# Patient Record
Sex: FEMALE | Race: WHITE | NOT HISPANIC OR LATINO
[De-identification: names, ages, dates, MRNs, and addresses within clinical notes are randomized per-mention and may not be internally consistent; named-entity substitution may affect disease eponyms.]

---

## 2018-12-14 ENCOUNTER — MED ADMIN CHARGE (OUTPATIENT)
Age: 33
End: 2018-12-14

## 2018-12-14 ENCOUNTER — APPOINTMENT (OUTPATIENT)
Dept: FAMILY MEDICINE | Facility: CLINIC | Age: 33
End: 2018-12-14
Payer: MEDICAID

## 2018-12-14 VITALS
WEIGHT: 103 LBS | DIASTOLIC BLOOD PRESSURE: 78 MMHG | OXYGEN SATURATION: 99 % | BODY MASS INDEX: 20.22 KG/M2 | HEART RATE: 73 BPM | TEMPERATURE: 97.6 F | SYSTOLIC BLOOD PRESSURE: 113 MMHG | HEIGHT: 60 IN

## 2018-12-14 DIAGNOSIS — Z83.49 FAMILY HISTORY OF OTHER ENDOCRINE, NUTRITIONAL AND METABOLIC DISEASES: ICD-10-CM

## 2018-12-14 DIAGNOSIS — Z80.42 FAMILY HISTORY OF MALIGNANT NEOPLASM OF PROSTATE: ICD-10-CM

## 2018-12-14 DIAGNOSIS — Z76.89 PERSONS ENCOUNTERING HEALTH SERVICES IN OTHER SPECIFIED CIRCUMSTANCES: ICD-10-CM

## 2018-12-14 DIAGNOSIS — Z83.3 FAMILY HISTORY OF DIABETES MELLITUS: ICD-10-CM

## 2018-12-14 DIAGNOSIS — N63.20 UNSPECIFIED LUMP IN THE LEFT BREAST, UNSPECIFIED QUADRANT: ICD-10-CM

## 2018-12-14 LAB
BASOPHILS # BLD AUTO: 0.01 K/UL
BASOPHILS NFR BLD AUTO: 0.2 %
EOSINOPHIL # BLD AUTO: 0 K/UL
EOSINOPHIL NFR BLD AUTO: 0 %
HCT VFR BLD CALC: 42.5 %
HGB BLD-MCNC: 14 G/DL
IMM GRANULOCYTES NFR BLD AUTO: 0.2 %
LYMPHOCYTES # BLD AUTO: 1.19 K/UL
LYMPHOCYTES NFR BLD AUTO: 22.3 %
MAN DIFF?: NORMAL
MCHC RBC-ENTMCNC: 31.4 PG
MCHC RBC-ENTMCNC: 32.9 GM/DL
MCV RBC AUTO: 95.3 FL
MONOCYTES # BLD AUTO: 0.47 K/UL
MONOCYTES NFR BLD AUTO: 8.8 %
NEUTROPHILS # BLD AUTO: 3.66 K/UL
NEUTROPHILS NFR BLD AUTO: 68.5 %
PLATELET # BLD AUTO: 217 K/UL
RBC # BLD: 4.46 M/UL
RBC # FLD: 13.2 %
WBC # FLD AUTO: 5.34 K/UL

## 2018-12-14 PROCEDURE — G0009: CPT

## 2018-12-14 PROCEDURE — 99385 PREV VISIT NEW AGE 18-39: CPT | Mod: 25

## 2018-12-14 PROCEDURE — 36415 COLL VENOUS BLD VENIPUNCTURE: CPT

## 2018-12-14 PROCEDURE — 90670 PCV13 VACCINE IM: CPT

## 2018-12-14 RX ORDER — FOLIC ACID 1 MG/1
1 TABLET ORAL DAILY
Qty: 30 | Refills: 3 | Status: ACTIVE | COMMUNITY
Start: 2018-12-14

## 2018-12-14 RX ORDER — CARBAMAZEPINE 200 MG/1
200 TABLET, EXTENDED RELEASE ORAL
Refills: 0 | Status: ACTIVE | COMMUNITY
Start: 2018-12-14

## 2018-12-14 RX ORDER — ACETAZOLAMIDE 250 MG/1
250 TABLET ORAL TWICE DAILY
Refills: 0 | Status: ACTIVE | COMMUNITY
Start: 2018-12-14

## 2018-12-14 NOTE — ASSESSMENT
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

## 2018-12-14 NOTE — PHYSICAL EXAM

## 2018-12-14 NOTE — HISTORY OF PRESENT ILLNESS
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 Hx Shunts with several revisions (last 2008)\par Seen in the past seen by GI.\par 2008: Horse back riding accident, and since then is unable to walk independently with severe weight loss.\par \par Pt attends day care program at [facility] in [facility].\par \par According to mother, pt is a" happy child"

## 2018-12-14 NOTE — HEALTH RISK ASSESSMENT
[Good] : ~his/her~  mood as  good [Patient not ambulatory (Wheelchair)] : Patient is not ambulatory (Wheelchair) [0] : 2) Feeling down, depressed, or hopeless: Not at all (0) [HIV test declined] : HIV test declined [Hepatitis C test declined] : Hepatitis C test declined [None] : None [With Family] : lives with family [On disability] : on disability [Single] : single [Feels Safe at Home] : Feels safe at home [Full assistance needed] : managing finances [Smoke Detector] : smoke detector [Seat Belt] :  uses seat belt [Discussed at today's visit] : Advance Directives Discussed at today's visit [Designated Healthcare Proxy] : Designated healthcare proxy [Name: ___] : Health Care Proxy's Name: [unfilled]  [Relationship: ___] : Relationship: [unfilled] [Aggressive treatment] : aggressive treatment [] : No [Change in mental status noted] : No change in mental status noted [Language] : denies difficulty with language [Handling Complex Tasks] : denies difficulty handling complex tasks [Sexually Active] : not sexually active [Reports changes in hearing] : Reports no changes in hearing [Reports changes in vision] : Reports no changes in vision [Reports changes in dental health] : Reports no changes in dental health [de-identified] : Dr Bhagat in [place] [de-identified] : [facility]

## 2018-12-21 LAB
25(OH)D3 SERPL-MCNC: 51.3 NG/ML
ALBUMIN SERPL ELPH-MCNC: 4.6 G/DL
ALP BLD-CCNC: 65 U/L
ALT SERPL-CCNC: 19 U/L
ANION GAP SERPL CALC-SCNC: 10 MMOL/L
AST SERPL-CCNC: 23 U/L
BILIRUB SERPL-MCNC: 0.2 MG/DL
BUN SERPL-MCNC: 11 MG/DL
CALCIUM SERPL-MCNC: 9 MG/DL
CHLORIDE SERPL-SCNC: 108 MMOL/L
CHOLEST SERPL-MCNC: 161 MG/DL
CHOLEST/HDLC SERPL: 2.1 RATIO
CO2 SERPL-SCNC: 23 MMOL/L
CREAT SERPL-MCNC: 0.55 MG/DL
FOLATE SERPL-MCNC: >20 NG/ML
GLUCOSE SERPL-MCNC: 83 MG/DL
HCV AB SER QL: NONREACTIVE
HCV S/CO RATIO: 0.09 S/CO
HDLC SERPL-MCNC: 76 MG/DL
HIV1+2 AB SPEC QL IA.RAPID: NONREACTIVE
LDLC SERPL CALC-MCNC: 67 MG/DL
POTASSIUM SERPL-SCNC: 4.8 MMOL/L
PROT SERPL-MCNC: 7.1 G/DL
SODIUM SERPL-SCNC: 141 MMOL/L
TRIGL SERPL-MCNC: 90 MG/DL
TSH SERPL-ACNC: 1.71 UIU/ML
VIT B12 SERPL-MCNC: >2000 PG/ML

## 2019-02-22 ENCOUNTER — APPOINTMENT (OUTPATIENT)
Dept: FAMILY MEDICINE | Facility: CLINIC | Age: 34
End: 2019-02-22
Payer: MEDICAID

## 2019-02-22 VITALS
SYSTOLIC BLOOD PRESSURE: 107 MMHG | TEMPERATURE: 99 F | HEART RATE: 76 BPM | HEIGHT: 60 IN | WEIGHT: 103 LBS | OXYGEN SATURATION: 98 % | BODY MASS INDEX: 20.22 KG/M2 | DIASTOLIC BLOOD PRESSURE: 72 MMHG

## 2019-02-22 PROCEDURE — 99213 OFFICE O/P EST LOW 20 MIN: CPT

## 2019-02-22 NOTE — PHYSICAL EXAM
[No Acute Distress] : no acute distress [Well Nourished] : well nourished [No Respiratory Distress] : no respiratory distress  [Clear to Auscultation] : lungs were clear to auscultation bilaterally [No Accessory Muscle Use] : no accessory muscle use [Normal Rate] : normal rate  [Regular Rhythm] : with a regular rhythm [Normal S1, S2] : normal S1 and S2 [No Murmur] : no murmur heard [de-identified] : n [de-identified] : wheeled in on a wheelchair.

## 2019-02-22 NOTE — HISTORY OF PRESENT ILLNESS
[Parent] : parent [FreeTextEntry8] : Pt presents to have paperwork filled out for home care services. Pt is accompanied by her mother, no acute complains.

## 2019-05-13 ENCOUNTER — RX CHANGE (OUTPATIENT)
Age: 34
End: 2019-05-13

## 2019-07-12 ENCOUNTER — APPOINTMENT (OUTPATIENT)
Dept: FAMILY MEDICINE | Facility: CLINIC | Age: 34
End: 2019-07-12

## 2019-09-06 ENCOUNTER — APPOINTMENT (OUTPATIENT)
Dept: FAMILY MEDICINE | Facility: CLINIC | Age: 34
End: 2019-09-06
Payer: MEDICAID

## 2019-09-06 ENCOUNTER — APPOINTMENT (OUTPATIENT)
Dept: FAMILY MEDICINE | Facility: CLINIC | Age: 34
End: 2019-09-06

## 2019-09-06 VITALS — BODY MASS INDEX: 19.53 KG/M2 | WEIGHT: 100 LBS

## 2019-09-06 VITALS
HEART RATE: 76 BPM | DIASTOLIC BLOOD PRESSURE: 67 MMHG | SYSTOLIC BLOOD PRESSURE: 99 MMHG | TEMPERATURE: 97.5 F | HEIGHT: 60 IN

## 2019-09-06 DIAGNOSIS — H61.23 IMPACTED CERUMEN, BILATERAL: ICD-10-CM

## 2019-09-06 PROCEDURE — 99395 PREV VISIT EST AGE 18-39: CPT | Mod: 25

## 2019-09-06 PROCEDURE — 36415 COLL VENOUS BLD VENIPUNCTURE: CPT

## 2019-09-06 NOTE — ASSESSMENT
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

## 2019-09-06 NOTE — HISTORY OF PRESENT ILLNESS
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 Hx Shunts with several revisions (last 2008)\par Seen in the past  by GI.\par 2008: Horse back riding accident, and since then is unable to walk independently with severe weight loss.\par \par Pt attends day care program at [facility] in [facility].\par \par According to mother, pt is a" happy child" \par

## 2019-09-06 NOTE — PHYSICAL EXAM
[No Acute Distress] : no acute distress [Well Developed] : well developed [Well Nourished] : well nourished [Well-Appearing] : well-appearing [Normal Sclera/Conjunctiva] : normal sclera/conjunctiva [EOMI] : extraocular movements intact [PERRL] : pupils equal round and reactive to light [No JVD] : no jugular venous distention [Normal Oropharynx] : the oropharynx was normal [Normal Outer Ear/Nose] : the outer ears and nose were normal in appearance [Thyroid Normal, No Nodules] : the thyroid was normal and there were no nodules present [Supple] : supple [No Lymphadenopathy] : no lymphadenopathy [No Respiratory Distress] : no respiratory distress  [No Accessory Muscle Use] : no accessory muscle use [Clear to Auscultation] : lungs were clear to auscultation bilaterally [Normal Rate] : normal rate  [Regular Rhythm] : with a regular rhythm [Normal S1, S2] : normal S1 and S2 [No Murmur] : no murmur heard [No Carotid Bruits] : no carotid bruits [No Abdominal Bruit] : a ~M bruit was not heard ~T in the abdomen [No Varicosities] : no varicosities [No Edema] : there was no peripheral edema [Pedal Pulses Present] : the pedal pulses are present [No Palpable Aorta] : no palpable aorta [No Extremity Clubbing/Cyanosis] : no extremity clubbing/cyanosis [Non-distended] : non-distended [Soft] : abdomen soft [Non Tender] : non-tender [No HSM] : no HSM [No Masses] : no abdominal mass palpated [Normal Bowel Sounds] : normal bowel sounds [Normal Posterior Cervical Nodes] : no posterior cervical lymphadenopathy [No CVA Tenderness] : no CVA  tenderness [Normal Anterior Cervical Nodes] : no anterior cervical lymphadenopathy [No Spinal Tenderness] : no spinal tenderness [No Joint Swelling] : no joint swelling [No Rash] : no rash [Grossly Normal Strength/Tone] : grossly normal strength/tone [Normal Affect] : the affect was normal [Normal Insight/Judgement] : insight and judgment were intact [de-identified] : wheelchair bound [de-identified] : B/L impacted wax [de-identified] : non verbal. neurologic deficits. Non ambulatory

## 2019-09-06 NOTE — COUNSELING
[Use recommended devices] : Use recommended devices [Behavioral health counseling provided] : Behavioral health counseling provided [Needs reinforcement, provided] : Patient needs reinforcement on understanding of lifestyle changes and steps needed to achieve self management goal; reinforcement was provided [Limited decision making ability] : Limited decision making ability

## 2019-09-06 NOTE — HEALTH RISK ASSESSMENT
[Good] : ~his/her~  mood as  good [No] : In the past 12 months have you used drugs other than those required for medical reasons? No [0] : 2) Feeling down, depressed, or hopeless: Not at all (0) [HIV test declined] : HIV test declined [Language] : difficulty with language [Hepatitis C test declined] : Hepatitis C test declined [Behavior] : difficulty with behavior [Learning/Retaining New Information] : difficulty learning/retaining new information [Reasoning] : difficulty with reasoning [Handling Complex Tasks] : difficulty handling complex tasks [Spatial Ability and Orientation] : difficulty with spatial ability and orientation [Single] : single [With Family] : lives with family [Behavioral] : behavioral [Full assistance needed] : managing medications [Smoke Detector] : smoke detector [Seat Belt] :  uses seat belt [Designated Healthcare Proxy] : Designated healthcare proxy [With Patient/Caregiver] : With Patient/Caregiver [Name: ___] : Health Care Proxy's Name: [unfilled]  [Relationship: ___] : Relationship: [unfilled] [Patient not ambulatory] : Patient is not ambulatory [de-identified] : no [] : No [de-identified] : no [Change in mental status noted] : No change in mental status noted [Sexually Active] : not sexually active [Reports changes in hearing] : Reports no changes in hearing [Reports changes in dental health] : Reports no changes in dental health [Reports changes in vision] : Reports no changes in vision [MammogramComments] : Annual Breast USG due to long hx Left breast lump (2010 aprox) [PapSmearComments] : N/A [ColonoscopyComments] : N/A [BoneDensityComments] : N/A [de-identified] : Attends Day care at [facility] in [facility] [AdvancecareDate] : 09/06/19

## 2019-09-09 LAB
25(OH)D3 SERPL-MCNC: 54.4 NG/ML
ALBUMIN SERPL ELPH-MCNC: 4.8 G/DL
ALP BLD-CCNC: 52 U/L
ALT SERPL-CCNC: 15 U/L
ANION GAP SERPL CALC-SCNC: 13 MMOL/L
AST SERPL-CCNC: 12 U/L
BASOPHILS # BLD AUTO: 0.02 K/UL
BASOPHILS NFR BLD AUTO: 0.5 %
BILIRUB SERPL-MCNC: 0.2 MG/DL
BUN SERPL-MCNC: 18 MG/DL
CALCIUM SERPL-MCNC: 9.2 MG/DL
CHLORIDE SERPL-SCNC: 112 MMOL/L
CHOLEST SERPL-MCNC: 170 MG/DL
CHOLEST/HDLC SERPL: 1.9 RATIO
CO2 SERPL-SCNC: 20 MMOL/L
CREAT SERPL-MCNC: 0.63 MG/DL
EOSINOPHIL # BLD AUTO: 0 K/UL
EOSINOPHIL NFR BLD AUTO: 0 %
FERRITIN SERPL-MCNC: 47 NG/ML
FOLATE SERPL-MCNC: >20 NG/ML
GLUCOSE SERPL-MCNC: 88 MG/DL
HCT VFR BLD CALC: 42.2 %
HDLC SERPL-MCNC: 89 MG/DL
HGB BLD-MCNC: 13.7 G/DL
IMM GRANULOCYTES NFR BLD AUTO: 0.3 %
LDLC SERPL CALC-MCNC: 69 MG/DL
LYMPHOCYTES # BLD AUTO: 1.19 K/UL
LYMPHOCYTES NFR BLD AUTO: 30.8 %
MAN DIFF?: NORMAL
MCHC RBC-ENTMCNC: 31.5 PG
MCHC RBC-ENTMCNC: 32.5 GM/DL
MCV RBC AUTO: 97 FL
MONOCYTES # BLD AUTO: 0.36 K/UL
MONOCYTES NFR BLD AUTO: 9.3 %
NEUTROPHILS # BLD AUTO: 2.28 K/UL
NEUTROPHILS NFR BLD AUTO: 59.1 %
PLATELET # BLD AUTO: 170 K/UL
POTASSIUM SERPL-SCNC: 4.9 MMOL/L
PROT SERPL-MCNC: 6.9 G/DL
RBC # BLD: 4.35 M/UL
RBC # FLD: 12.5 %
SODIUM SERPL-SCNC: 145 MMOL/L
TRIGL SERPL-MCNC: 61 MG/DL
TSH SERPL-ACNC: 1.07 UIU/ML
VIT B12 SERPL-MCNC: 906 PG/ML
WBC # FLD AUTO: 3.86 K/UL

## 2019-11-21 ENCOUNTER — MEDICATION RENEWAL (OUTPATIENT)
Age: 34
End: 2019-11-21

## 2019-11-22 ENCOUNTER — MEDICATION RENEWAL (OUTPATIENT)
Age: 34
End: 2019-11-22

## 2019-12-03 ENCOUNTER — MEDICATION RENEWAL (OUTPATIENT)
Age: 34
End: 2019-12-03

## 2019-12-16 DIAGNOSIS — N39.42 INCONTINENCE W/OUT SENSORY AWARENESS: ICD-10-CM

## 2020-01-24 ENCOUNTER — APPOINTMENT (OUTPATIENT)
Dept: FAMILY MEDICINE | Facility: CLINIC | Age: 35
End: 2020-01-24
Payer: MEDICAID

## 2020-01-24 VITALS
OXYGEN SATURATION: 97 % | WEIGHT: 100 LBS | SYSTOLIC BLOOD PRESSURE: 113 MMHG | HEART RATE: 77 BPM | HEIGHT: 60 IN | BODY MASS INDEX: 19.63 KG/M2 | DIASTOLIC BLOOD PRESSURE: 73 MMHG

## 2020-01-24 PROCEDURE — 99213 OFFICE O/P EST LOW 20 MIN: CPT

## 2020-01-24 NOTE — HEALTH RISK ASSESSMENT
[No] : In the past 12 months have you used drugs other than those required for medical reasons? No [No falls in past year] : Patient reported no falls in the past year [(PHQ-2) Unable to screen] : PHQ-2: unable to screen [] : No [UnableToScreenReason] : Cerebral palsy

## 2020-01-24 NOTE — ASSESSMENT
[FreeTextEntry1] : Pt currently stable but still in need of Home Care services. Letter of necessity typed and given to patient's mother.\par No need for medication refills at this time\par Continue current regimen\par Follow up as scheduled.\par \par Discussed with sUpervising physician Dr Lynnette Sommer M.D.

## 2020-01-24 NOTE — HISTORY OF PRESENT ILLNESS
[FreeTextEntry1] : Letter for home care [de-identified] : Pt presents brought in by her mother, requesting a letter so that she can continue to receive home care. Pt's mother states that she had a " cold last week" with low grade fever which since has subsided. No other complains

## 2020-03-05 RX ORDER — EPINEPHRINE 0.3 MG/.3ML
0.3 INJECTION INTRAMUSCULAR
Qty: 1 | Refills: 0 | Status: ACTIVE | COMMUNITY
Start: 2019-05-13 | End: 1900-01-01

## 2020-06-15 DIAGNOSIS — Z93.1 GASTROSTOMY STATUS: ICD-10-CM

## 2020-10-27 ENCOUNTER — TRANSCRIPTION ENCOUNTER (OUTPATIENT)
Age: 35
End: 2020-10-27

## 2021-01-05 DIAGNOSIS — Z20.822 CONTACT WITH AND (SUSPECTED) EXPOSURE TO COVID-19: ICD-10-CM

## 2021-01-08 ENCOUNTER — NON-APPOINTMENT (OUTPATIENT)
Age: 36
End: 2021-01-08

## 2021-05-27 ENCOUNTER — APPOINTMENT (OUTPATIENT)
Dept: FAMILY MEDICINE | Facility: CLINIC | Age: 36
End: 2021-05-27
Payer: MEDICARE

## 2021-05-27 VITALS
WEIGHT: 110 LBS | HEIGHT: 60 IN | BODY MASS INDEX: 21.6 KG/M2 | OXYGEN SATURATION: 98 % | RESPIRATION RATE: 16 BRPM | DIASTOLIC BLOOD PRESSURE: 70 MMHG | SYSTOLIC BLOOD PRESSURE: 110 MMHG | TEMPERATURE: 98.2 F | HEART RATE: 83 BPM

## 2021-05-27 DIAGNOSIS — Z23 ENCOUNTER FOR IMMUNIZATION: ICD-10-CM

## 2021-05-27 PROCEDURE — 99213 OFFICE O/P EST LOW 20 MIN: CPT

## 2021-05-27 RX ORDER — DIAZEPAM 5 MG/100UL
5 SPRAY NASAL
Refills: 0 | Status: ACTIVE | COMMUNITY
Start: 2021-05-27

## 2021-05-27 RX ORDER — DIAZEPAM 10 MG/2ML
10 GEL RECTAL
Refills: 0 | Status: DISCONTINUED | COMMUNITY
Start: 2018-12-14 | End: 2021-05-27

## 2021-05-27 NOTE — PHYSICAL EXAM
[Well Nourished] : well nourished [Well Developed] : well developed [Well-Appearing] : well-appearing [Normal Sclera/Conjunctiva] : normal sclera/conjunctiva [PERRL] : pupils equal round and reactive to light [EOMI] : extraocular movements intact [Normal Outer Ear/Nose] : the outer ears and nose were normal in appearance [Normal Oropharynx] : the oropharynx was normal [No JVD] : no jugular venous distention [No Lymphadenopathy] : no lymphadenopathy [Supple] : supple [Thyroid Normal, No Nodules] : the thyroid was normal and there were no nodules present [No Respiratory Distress] : no respiratory distress  [No Accessory Muscle Use] : no accessory muscle use [Clear to Auscultation] : lungs were clear to auscultation bilaterally [Normal Rate] : normal rate  [Regular Rhythm] : with a regular rhythm [Normal S1, S2] : normal S1 and S2 [No Murmur] : no murmur heard [No Carotid Bruits] : no carotid bruits [No Abdominal Bruit] : a ~M bruit was not heard ~T in the abdomen [No Varicosities] : no varicosities [Pedal Pulses Present] : the pedal pulses are present [No Edema] : there was no peripheral edema [No Palpable Aorta] : no palpable aorta [No Extremity Clubbing/Cyanosis] : no extremity clubbing/cyanosis [Soft] : abdomen soft [Non Tender] : non-tender [Non-distended] : non-distended [No Masses] : no abdominal mass palpated [No HSM] : no HSM [Normal Bowel Sounds] : normal bowel sounds [Normal Posterior Cervical Nodes] : no posterior cervical lymphadenopathy [Normal Anterior Cervical Nodes] : no anterior cervical lymphadenopathy [No CVA Tenderness] : no CVA  tenderness [No Spinal Tenderness] : no spinal tenderness [No Joint Swelling] : no joint swelling [Grossly Normal Strength/Tone] : grossly normal strength/tone [No Rash] : no rash [Normal Affect] : the affect was normal [Normal Insight/Judgement] : insight and judgment were intact [de-identified] : wheelchair bound/ Non verbal [de-identified] : B/L impacted wax [de-identified] : non verbal. neurologic deficits. Non ambulatory

## 2021-05-27 NOTE — HISTORY OF PRESENT ILLNESS
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 Hx Shunts with several revisions (last 2008)\par Seen in the past by GI.\par 2008: Horse back riding accident, and since then is unable to walk independently with severe weight loss.\par Pt attends day care program at [facility] in [facility].\par According to mother, pt is a" happy child" \par  \par

## 2021-05-27 NOTE — ASSESSMENT
[FreeTextEntry1] : Pt currently stable but still in need of Home Care services. \par No need for medication refills at this time\par Continue current regimen\par Follow up q 6 months\par Will request blood test done at [facility] in 3/2021.\par Continue regular f/up with Neurology.,\par \par

## 2021-07-21 ENCOUNTER — OUTPATIENT (OUTPATIENT)
Dept: OUTPATIENT SERVICES | Facility: HOSPITAL | Age: 36
LOS: 1 days | End: 2021-07-21
Payer: MEDICARE

## 2021-07-21 VITALS
DIASTOLIC BLOOD PRESSURE: 86 MMHG | OXYGEN SATURATION: 99 % | RESPIRATION RATE: 20 BRPM | SYSTOLIC BLOOD PRESSURE: 120 MMHG | HEART RATE: 94 BPM | TEMPERATURE: 98 F | WEIGHT: 104.94 LBS | HEIGHT: 60 IN

## 2021-07-21 DIAGNOSIS — Z98.890 OTHER SPECIFIED POSTPROCEDURAL STATES: Chronic | ICD-10-CM

## 2021-07-21 DIAGNOSIS — Z98.2 PRESENCE OF CEREBROSPINAL FLUID DRAINAGE DEVICE: Chronic | ICD-10-CM

## 2021-07-21 DIAGNOSIS — K05.6 PERIODONTAL DISEASE, UNSPECIFIED: ICD-10-CM

## 2021-07-21 DIAGNOSIS — Z87.898 PERSONAL HISTORY OF OTHER SPECIFIED CONDITIONS: ICD-10-CM

## 2021-07-21 DIAGNOSIS — Z01.818 ENCOUNTER FOR OTHER PREPROCEDURAL EXAMINATION: ICD-10-CM

## 2021-07-21 DIAGNOSIS — K02.62 DENTAL CARIES ON SMOOTH SURFACE PENETRATING INTO DENTIN: ICD-10-CM

## 2021-07-21 LAB
ANION GAP SERPL CALC-SCNC: 14 MMOL/L — SIGNIFICANT CHANGE UP (ref 5–17)
BUN SERPL-MCNC: 14 MG/DL — SIGNIFICANT CHANGE UP (ref 7–23)
CALCIUM SERPL-MCNC: 9.3 MG/DL — SIGNIFICANT CHANGE UP (ref 8.4–10.5)
CHLORIDE SERPL-SCNC: 109 MMOL/L — HIGH (ref 96–108)
CO2 SERPL-SCNC: 17 MMOL/L — LOW (ref 22–31)
CREAT SERPL-MCNC: 0.52 MG/DL — SIGNIFICANT CHANGE UP (ref 0.5–1.3)
GLUCOSE SERPL-MCNC: 82 MG/DL — SIGNIFICANT CHANGE UP (ref 70–99)
HCT VFR BLD CALC: 42.8 % — SIGNIFICANT CHANGE UP (ref 34.5–45)
HGB BLD-MCNC: 14.1 G/DL — SIGNIFICANT CHANGE UP (ref 11.5–15.5)
MCHC RBC-ENTMCNC: 30.9 PG — SIGNIFICANT CHANGE UP (ref 27–34)
MCHC RBC-ENTMCNC: 32.9 GM/DL — SIGNIFICANT CHANGE UP (ref 32–36)
MCV RBC AUTO: 93.7 FL — SIGNIFICANT CHANGE UP (ref 80–100)
NRBC # BLD: 0 /100 WBCS — SIGNIFICANT CHANGE UP (ref 0–0)
PLATELET # BLD AUTO: 203 K/UL — SIGNIFICANT CHANGE UP (ref 150–400)
POTASSIUM SERPL-MCNC: 4.4 MMOL/L — SIGNIFICANT CHANGE UP (ref 3.5–5.3)
POTASSIUM SERPL-SCNC: 4.4 MMOL/L — SIGNIFICANT CHANGE UP (ref 3.5–5.3)
RBC # BLD: 4.57 M/UL — SIGNIFICANT CHANGE UP (ref 3.8–5.2)
RBC # FLD: 12.4 % — SIGNIFICANT CHANGE UP (ref 10.3–14.5)
SODIUM SERPL-SCNC: 140 MMOL/L — SIGNIFICANT CHANGE UP (ref 135–145)
WBC # BLD: 6.6 K/UL — SIGNIFICANT CHANGE UP (ref 3.8–10.5)
WBC # FLD AUTO: 6.6 K/UL — SIGNIFICANT CHANGE UP (ref 3.8–10.5)

## 2021-07-21 PROCEDURE — 85027 COMPLETE CBC AUTOMATED: CPT

## 2021-07-21 PROCEDURE — 80048 BASIC METABOLIC PNL TOTAL CA: CPT

## 2021-07-21 PROCEDURE — G0463: CPT

## 2021-07-21 RX ORDER — LIDOCAINE HCL 20 MG/ML
0.2 VIAL (ML) INJECTION ONCE
Refills: 0 | Status: DISCONTINUED | OUTPATIENT
Start: 2021-08-04 | End: 2021-08-19

## 2021-07-21 RX ORDER — SODIUM CHLORIDE 9 MG/ML
3 INJECTION INTRAMUSCULAR; INTRAVENOUS; SUBCUTANEOUS EVERY 8 HOURS
Refills: 0 | Status: DISCONTINUED | OUTPATIENT
Start: 2021-08-04 | End: 2021-08-19

## 2021-07-21 NOTE — H&P PST ADULT - NSICDXPASTSURGICALHX_GEN_ALL_CORE_FT
PAST SURGICAL HISTORY:  History of Nissen fundoplication 1985    S/P  shunt several last 1998    Status post breast lumpectomy left; benign    Status post left foot surgery heel cord lengthening

## 2021-07-21 NOTE — H&P PST ADULT - NSANTHOSAYNRD_GEN_A_CORE
No. BELINDA screening performed.  STOP BANG Legend: 0-2 = LOW Risk; 3-4 = INTERMEDIATE Risk; 5-8 = HIGH Risk

## 2021-07-21 NOTE — H&P PST ADULT - NSWEIGHTCALCTOOLDRUG_GEN_A_CORE
Called and spoke to [place] from [address] [place] [address] to see if authorization is required for CPT code , no auth per reference number [id number]. Called and spoke to patient's mother and she is aware of date of procedure as well as follow up appointment.  Electronically signed by Torrie Rodriguez on 3/21/18 at 1:31 PM  used

## 2021-07-21 NOTE — H&P PST ADULT - NSICDXPROBLEM_GEN_ALL_CORE_FT
PROBLEM DIAGNOSES  Problem: Periodontal disease, unspecified  Assessment and Plan: Scheduled for procedure on 8/4/2021. Surgical instructions reviewed w/ mother. Vaccinated w/ Moderna, last dose on 3/26/21. PCP to be seen next week for medical evaluation. Labs drawn today at [facility].     Problem: History of seizures  Assessment and Plan: Controlled w/ medication, followed by neurologist.

## 2021-07-21 NOTE — H&P PST ADULT - NSICDXPASTMEDICALHX_GEN_ALL_CORE_FT
PAST MEDICAL HISTORY:  Dental caries on smooth surface penetrating into dentin     GERD (gastroesophageal reflux disease)     H/O bacterial meningitis @7 days old    History of hydrocephalus     History of seizures     Periodontal disease, unspecified

## 2021-07-21 NOTE — H&P PST ADULT - HISTORY OF PRESENT ILLNESS
35yr old [place] female presents to [facility] via wheelchair accompanied by mother for Comprehensive Dental Treatment on 2021. Pt lives at home w/ mother, father  from COVID in January. As per mother pt w/ bacterial meningitis as infant and mental retardation, hydrocephalus and seizure disorder ( shunt; followed by neuro controlled w/ medications) last seizure ~2 months ago. Pt to see PCP next week for med eval. Labs sent today at [facility]. Fully vaccinated for COVID w/ Moderna last dose on 3/26/21.

## 2021-07-21 NOTE — H&P PST ADULT - NSICDXFAMILYHX_GEN_ALL_CORE_FT
FAMILY HISTORY:  Father  Still living? Unknown  FH: type 2 diabetes, Age at diagnosis: Age Unknown    Mother  Still living? Yes, Estimated age: Age Unknown  Family history of hypothyroidism, Age at diagnosis: Age Unknown  Family history of lung cancer, Age at diagnosis: Age Unknown  Family hx of hypertension, Age at diagnosis: Age Unknown  FH: type 2 diabetes, Age at diagnosis: Age Unknown

## 2021-07-28 ENCOUNTER — APPOINTMENT (OUTPATIENT)
Dept: FAMILY MEDICINE | Facility: CLINIC | Age: 36
End: 2021-07-28
Payer: MEDICARE

## 2021-07-28 VITALS
TEMPERATURE: 97.6 F | DIASTOLIC BLOOD PRESSURE: 72 MMHG | RESPIRATION RATE: 12 BRPM | SYSTOLIC BLOOD PRESSURE: 106 MMHG | BODY MASS INDEX: 21.01 KG/M2 | HEIGHT: 60 IN | WEIGHT: 107 LBS

## 2021-07-28 PROCEDURE — 99214 OFFICE O/P EST MOD 30 MIN: CPT

## 2021-07-28 RX ORDER — ZONISAMIDE 25 MG/1
25 CAPSULE ORAL
Refills: 0 | Status: ACTIVE | COMMUNITY
Start: 2021-07-28

## 2021-07-28 NOTE — ASSESSMENT
[High Risk Surgery - Intraperitoneal, Intrathoracic or Supringuinal Vascular Procedures] : High Risk Surgery - Intraperitoneal, Intrathoracic or Supringuinal Vascular Procedures - No (0) [Ischemic Heart Disease] : Ischemic Heart Disease - No (0) [Congestive Heart Failure] : Congestive Heart Failure - No (0) [Prior Cerebrovascular Accident or TIA] : Prior Cerebrovascular Accident or TIA - No (0) [Creatinine >= 2mg/dL (1 Point)] : Creatinine >= 2mg/dL - No (0) [Insulin-dependent Diabetic (1 Point)] : Insulin-dependent Diabetic - No (0) [0] : 0 , RCRI Class: I, Risk of Post-Op Cardiac Complications: 3.9%, 95% CI for Risk Estimate: 2.8% - 5.4% [Patient Optimized for Surgery] : Patient optimized for surgery [No Further Testing Recommended] : no further testing recommended [Continue medications as is] : Continue current medications [As per surgery] : as per surgery [FreeTextEntry4] : Pt with no absolute contraindication ofr surgical procedure. Clear from Clinical stand point.

## 2021-07-28 NOTE — PHYSICAL EXAM
[Well Nourished] : well nourished [Well Developed] : well developed [Well-Appearing] : well-appearing [Normal Sclera/Conjunctiva] : normal sclera/conjunctiva [PERRL] : pupils equal round and reactive to light [EOMI] : extraocular movements intact [Normal Outer Ear/Nose] : the outer ears and nose were normal in appearance [Normal Oropharynx] : the oropharynx was normal [No JVD] : no jugular venous distention [No Lymphadenopathy] : no lymphadenopathy [Supple] : supple [Thyroid Normal, No Nodules] : the thyroid was normal and there were no nodules present [No Respiratory Distress] : no respiratory distress  [No Accessory Muscle Use] : no accessory muscle use [Clear to Auscultation] : lungs were clear to auscultation bilaterally [Normal Rate] : normal rate  [Regular Rhythm] : with a regular rhythm [Normal S1, S2] : normal S1 and S2 [No Murmur] : no murmur heard [No Carotid Bruits] : no carotid bruits [No Abdominal Bruit] : a ~M bruit was not heard ~T in the abdomen [No Varicosities] : no varicosities [Pedal Pulses Present] : the pedal pulses are present [No Edema] : there was no peripheral edema [No Palpable Aorta] : no palpable aorta [No Extremity Clubbing/Cyanosis] : no extremity clubbing/cyanosis [Soft] : abdomen soft [Non Tender] : non-tender [Non-distended] : non-distended [No Masses] : no abdominal mass palpated [No HSM] : no HSM [Normal Bowel Sounds] : normal bowel sounds [Normal Posterior Cervical Nodes] : no posterior cervical lymphadenopathy [Normal Anterior Cervical Nodes] : no anterior cervical lymphadenopathy [No CVA Tenderness] : no CVA  tenderness [No Spinal Tenderness] : no spinal tenderness [No Joint Swelling] : no joint swelling [Grossly Normal Strength/Tone] : grossly normal strength/tone [No Rash] : no rash [Normal Affect] : the affect was normal [Normal Insight/Judgement] : insight and judgment were intact [de-identified] : wheelchair bound/ Non verbal [de-identified] : non verbal. neurologic deficits. Non ambulatory

## 2021-07-28 NOTE — COUNSELING
[Behavioral health counseling provided] : behavioral health  [Fall prevention counseling provided] : fall prevention  [Limited decision making ability] : Limited decision making ability

## 2021-07-28 NOTE — HISTORY OF PRESENT ILLNESS
[No Pertinent Cardiac History] : no history of aortic stenosis, atrial fibrillation, coronary artery disease, recent myocardial infarction, or implantable device/pacemaker [No Pertinent Pulmonary History] : no history of asthma, COPD, sleep apnea, or smoking [No Adverse Anesthesia Reaction] : no adverse anesthesia reaction in self or family member [(Patient denies any chest pain, claudication, dyspnea on exertion, orthopnea, palpitations or syncope)] : Patient denies any chest pain, claudication, dyspnea on exertion, orthopnea, palpitations or syncope [Chronic Anticoagulation] : no chronic anticoagulation [Chronic Kidney Disease] : no chronic kidney disease [Diabetes] : no diabetes [FreeTextEntry1] : Dental procedure  under anesthesia [FreeTextEntry2] : 8/4/21 [FreeTextEntry3] : Dr Red [FreeTextEntry4] : 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 Hx Shunts with several revisions (last 2008)\par Seen in the past by GI.\par 2008: Horse back riding accident, and since then is unable to walk independently with severe weight loss.\par Pt attends day care program at [facility] in [facility].\par According to mother, pt is a" happy child" .\par Pt is here today for medical clearance for dental procedure under anesthesia.\par

## 2021-07-29 ENCOUNTER — TRANSCRIPTION ENCOUNTER (OUTPATIENT)
Age: 36
End: 2021-07-29

## 2021-08-03 ENCOUNTER — TRANSCRIPTION ENCOUNTER (OUTPATIENT)
Age: 36
End: 2021-08-03

## 2021-08-03 RX ORDER — SODIUM CHLORIDE 9 MG/ML
1000 INJECTION, SOLUTION INTRAVENOUS
Refills: 0 | Status: DISCONTINUED | OUTPATIENT
Start: 2021-08-04 | End: 2021-08-19

## 2021-08-03 NOTE — ASU DISCHARGE PLAN (ADULT/PEDIATRIC) - NURSING INSTRUCTIONS
******************************************************************************************  Next dose of TYLENOL may be taken at or after _______9:15______ PM if needed for pain. DO NOT take any additional products containing TYLENOL or ACETAMINOPHEN, such as VICODIN, PERCOCET, NORCO, EXCEDRIN, and any over-the-counter cold medications until this time. DO NOT CONSUME MORE THAN [id number] MG of TYLENOL (acetaminophen) in a 24-hour period.   ******************************************************************************************

## 2021-08-03 NOTE — ASU DISCHARGE PLAN (ADULT/PEDIATRIC) - CARE PROVIDER_API CALL
Patricia Red (DDS)  Dental Medicine  [address] [place] [address]  Phone: [phone]  Fax: [phone]  Follow Up Time:

## 2021-08-03 NOTE — ASU DISCHARGE PLAN (ADULT/PEDIATRIC) - PHYSICIAN SECTION COMPLETE
Yes Milli Mckoy MD  Attending Physician (Hospitalist)  pager: [phone]    Patient is a 84y old  Female who presents with a chief complaint of Unwitnessed fall (16 Sep 2019 12:04)        SUBJECTIVE / OVERNIGHT EVENTS:  Didnt sleep well last night. afebrile. tele afib 80-90s at time high 120s.   currently denies any SOB or anxiety. afebrile.    MEDICATIONS  (STANDING):  ALBUTerol/ipratropium for Nebulization 3 milliLiter(s) Nebulizer every 6 hours  atorvastatin 10 milliGRAM(s) Oral at bedtime  cloNIDine 0.1 milliGRAM(s) Oral two times a day  diltiazem    milliGRAM(s) Oral daily  docusate sodium 100 milliGRAM(s) Oral three times a day  enoxaparin Injectable 30 milliGRAM(s) SubCutaneous daily  entecavir 0.5 milliGRAM(s) Oral every 72 hours  ertapenem  IVPB 500 milliGRAM(s) IV Intermittent every 24 hours  furosemide   Injectable 40 milliGRAM(s) IV Push every 24 hours  insulin lispro (HumaLOG) corrective regimen sliding scale   SubCutaneous three times a day before meals  insulin lispro (HumaLOG) corrective regimen sliding scale   SubCutaneous at bedtime  insulin lispro Injectable (HumaLOG) 8 Unit(s) SubCutaneous three times a day before meals  metoprolol tartrate 100 milliGRAM(s) Oral two times a day  montelukast 10 milliGRAM(s) Oral daily  pantoprazole    Tablet 40 milliGRAM(s) Oral before breakfast  predniSONE   Tablet 60 milliGRAM(s) Oral every 24 hours  senna 2 Tablet(s) Oral at bedtime  sertraline 50 milliGRAM(s) Oral daily  trimethoprim  160 mG/sulfamethoxazole 800 mG 1 Tablet(s) Oral <User Schedule>    MEDICATIONS  (PRN):  acetaminophen   Tablet .. 650 milliGRAM(s) Oral every 6 hours PRN Mild Pain (1 - 3)  ALPRAZolam 0.5 milliGRAM(s) Oral three times a day PRN anxiety  polyethylene glycol 3350 17 Gram(s) Oral daily PRN Constiapation      Vital Signs Last 24 Hrs  T(C): 36.9 (16 Sep 2019 04:28), Max: 36.9 (16 Sep 2019 04:28)  T(F): 98.5 (16 Sep 2019 04:28), Max: 98.5 (16 Sep 2019 04:28)  HR: 85 (16 Sep 2019 04:28) (74 - 98)  BP: 148/78 (16 Sep 2019 04:28) (123/82 - 148/78)  BP(mean): --  RR: 18 (16 Sep 2019 04:28) (18 - 18)  SpO2: 100% (16 Sep 2019 04:28) (96% - 100%)  CAPILLARY BLOOD GLUCOSE      POCT Blood Glucose.: 234 mg/dL (16 Sep 2019 12:09)  POCT Blood Glucose.: 122 mg/dL (16 Sep 2019 08:20)  POCT Blood Glucose.: 141 mg/dL (15 Sep 2019 21:42)  POCT Blood Glucose.: 227 mg/dL (15 Sep 2019 16:59)    I&O's Summary    15 Sep 2019 07:01  -  16 Sep 2019 07:00  --------------------------------------------------------  IN: 880 mL / OUT: 1000 mL / NET: -120 mL          PHYSICAL EXAM  GENERAL: NAD, thin  HEAD:  Atraumatic, Normocephalic  EYES: EOMI, conjunctiva and sclera clear  NECK: Supple, No JVD  CHEST/LUNG: Clear to auscultation bilaterally; No wheeze  HEART: Regular rate and rhythm; No murmurs, rubs, or gallops  ABDOMEN: Soft, Nontender, Nondistended; Bowel sounds present  EXTREMITIES:  2+ Peripheral Pulses, trace LE edema  PSYCH: AAOx3  SKIN: No rashes or lesions    LABS:                        9.4    16.77 )-----------( 174      ( 16 Sep 2019 08:04 )             30.5     09-16    139  |  98  |  51<H>  ----------------------------<  137<H>  4.3   |  29  |  1.48<H>    Ca    8.9      16 Sep 2019 05:04  Mg     1.8     09-16                Culture - Blood (collected 14 Sep 2019 16:49)  Source: .Blood  Gram Stain (15 Sep 2019 22:50):    Growth in anaerobic bottle: Gram Positive Cocci in Clusters  Preliminary Report (15 Sep 2019 22:50):    Growth in anaerobic bottle: Gram Positive Cocci in Clusters    "Due to technical problems, Proteus sp. will Not be reported as part of    the BCID panel until further notice"    ***Blood Panel PCR results on this specimen are available    approximately 3 hours after the Gram stain result.***    Gram stain, PCR, and/or culture results may not always    correspond due to difference in methodologies.    ************************************************************    This PCR assay was performed using [employer].    The following targets are tested for: Enterococcus,    vancomycin resistant enterococci, Listeria monocytogenes,    coagulase negative staphylococci, S. aureus,    methicillin resistant S. aureus, Streptococcus agalactiae    (Group B), S. pneumoniae, S. pyogenes (Group A),    Acinetobacter baumannii, Enterobacter cloacae, E. coli,    Klebsiella oxytoca, K. pneumoniae, Proteus sp.,    Serratia marcescens, Haemophilus influenzae,    Neisseria meningitidis, Pseudomonas aeruginosa, Candida    albicans, C. glabrata, C krusei, C parapsilosis,    C. tropicalis and the KPC resistance gene.  Organism: Blood Culture PCR (15 Sep 2019 22:52)  Organism: Blood Culture PCR (15 Sep 2019 22:52)    Culture - Blood (collected 14 Sep 2019 16:49)  Source: .Blood  Preliminary Report (15 Sep 2019 17:01):    No growth to date.        RADIOLOGY & ADDITIONAL TESTS:    Imaging Personally Reviewed:  Consultant(s) Notes Reviewed:    Care Discussed with Consultants/Other Providers:

## 2021-08-03 NOTE — ASU DISCHARGE PLAN (ADULT/PEDIATRIC) - ASU DC SPECIAL INSTRUCTIONSFT
comprehensive dental treatment under general anesthesia    Tylenol prn pain    see Dr Red in one week [phone]    resume all medications and return to program on Friday

## 2021-08-04 ENCOUNTER — OUTPATIENT (OUTPATIENT)
Dept: OUTPATIENT SERVICES | Facility: HOSPITAL | Age: 36
LOS: 1 days | End: 2021-08-04
Payer: MEDICARE

## 2021-08-04 VITALS
HEART RATE: 74 BPM | RESPIRATION RATE: 20 BRPM | TEMPERATURE: 98 F | HEIGHT: 60 IN | SYSTOLIC BLOOD PRESSURE: 105 MMHG | WEIGHT: 104.94 LBS | OXYGEN SATURATION: 100 % | DIASTOLIC BLOOD PRESSURE: 67 MMHG

## 2021-08-04 VITALS
DIASTOLIC BLOOD PRESSURE: 84 MMHG | RESPIRATION RATE: 14 BRPM | SYSTOLIC BLOOD PRESSURE: 134 MMHG | HEART RATE: 95 BPM | OXYGEN SATURATION: 100 % | TEMPERATURE: 98 F

## 2021-08-04 DIAGNOSIS — K02.62 DENTAL CARIES ON SMOOTH SURFACE PENETRATING INTO DENTIN: ICD-10-CM

## 2021-08-04 DIAGNOSIS — Z98.890 OTHER SPECIFIED POSTPROCEDURAL STATES: Chronic | ICD-10-CM

## 2021-08-04 DIAGNOSIS — Z98.2 PRESENCE OF CEREBROSPINAL FLUID DRAINAGE DEVICE: Chronic | ICD-10-CM

## 2021-08-04 DIAGNOSIS — K05.6 PERIODONTAL DISEASE, UNSPECIFIED: ICD-10-CM

## 2021-08-04 PROCEDURE — D0210: CPT

## 2021-08-04 PROCEDURE — D1208: CPT

## 2021-08-04 PROCEDURE — 41820 EXCISION GUM EACH QUADRANT: CPT

## 2021-08-04 PROCEDURE — D0150: CPT

## 2021-08-04 PROCEDURE — D4341: CPT

## 2021-08-04 PROCEDURE — D1110: CPT

## 2021-08-04 NOTE — PRE-ANESTHESIA EVALUATION ADULT - HEIGHT IN INCHES
0 Information: Selecting Yes will display possible errors in your note based on the variables you have selected. This validation is only offered as a suggestion for you. PLEASE NOTE THAT THE VALIDATION TEXT WILL BE REMOVED WHEN YOU FINALIZE YOUR NOTE. IF YOU WANT TO FAX A PRELIMINARY NOTE YOU WILL NEED TO TOGGLE THIS TO 'NO' IF YOU DO NOT WANT IT IN YOUR FAXED NOTE.

## 2021-08-04 NOTE — ASU PATIENT PROFILE, ADULT - HARM RISK FACTORS
Clinical Pharmacy Services: Medication History    Marylin Wilcox is a 60 y.o. female presenting to [facility] for Fall, initial encounter [W19.XXXA]    She  has a past medical history of Alcohol abuse, Allergic rhinitis, Anemia, Crohn's colitis (CMS/HCC), Depression, GERD (gastroesophageal reflux disease), GI bleed, Hepatitis C, Hernia, diaphragmatic, Hypertension, IBS (irritable bowel syndrome), Iron deficiency, Stroke (CMS/HCC), Syncope, and Syncope.    Allergies as of 02/08/2020 - Reviewed 02/08/2020   Allergen Reaction Noted    Valsartan Nausea And Vomiting 06/13/2012       Medication information was obtained from: Nursing home  Pharmacy and Phone Number: [facility] [phone]    Prior to Admission Medications       Prescriptions Last Dose Informant Patient Reported? Taking?    acetaminophen (TYLENOL) 325 MG tablet  Nursing Home Yes Yes    Take 650 mg by mouth Every 6 (Six) Hours As Needed for Mild Pain .    amLODIPine (NORVASC) 10 MG tablet  Nursing Home Yes Yes    Take 10 mg by mouth Daily.    aspirin 81 MG EC tablet  Nursing Home Yes Yes    Take 81 mg by mouth Daily.    atorvastatin (LIPITOR) 40 MG tablet  Nursing Home Yes Yes    Take 40 mg by mouth Every Night.    carvedilol (COREG) 12.5 MG tablet  Nursing Home Yes Yes    Take 12.5 mg by mouth 2 (Two) Times a Day With Meals.    cetirizine (zyrTEC) 5 MG tablet  Nursing Home Yes Yes    Take 5 mg by mouth Daily.    DULoxetine (CYMBALTA) 60 MG capsule  Nursing Home Yes Yes    Take 60 mg by mouth Daily.    famotidine (PEPCID) 40 MG tablet  Nursing Home Yes Yes    Take 20 mg by mouth 2 (Two) Times a Day.    ferrous sulfate 325 (65 FE) MG tablet  Nursing Home Yes Yes    Take 325 mg by mouth Daily With Breakfast.    folic acid (FOLVITE) 1 MG tablet  Nursing Home Yes Yes    Take 1 mg by mouth Daily.    magnesium oxide (MAG-OX) 400 tablet tablet  Nursing Home Yes Yes    Take 400 mg by mouth Daily.    melatonin 1 MG tablet   Nursing Home Yes Yes    Take 1 mg by mouth Every Night.    Multiple Vitamins-Minerals (MULTIVITAMIN ADULT PO)  Nursing Home Yes Yes    Take  by mouth Daily.    nitroglycerin (NITROSTAT) 0.4 MG SL tablet  Nursing Home Yes Yes    Place 0.4 mg under the tongue Every 5 (Five) Minutes As Needed for Chest Pain. Take no more than 3 doses in 15 minutes.    ondansetron (ZOFRAN) 4 MG tablet  Nursing Home Yes Yes    Take 4 mg by mouth Every 6 (Six) Hours As Needed for Nausea or Vomiting.    Polyethylene Glycol 3350 (MIRALAX PO)  Nursing Home Yes Yes    Take 17 g by mouth Daily As Needed.    Pseudoephedrine-DM-GG (ROBAFEN CF PO)  Nursing Home Yes Yes    Take 10 mL by mouth 4 (Four) Times a Day As Needed.    thiamine (VITAMIN B-1) 100 MG tablet  Nursing Home Yes Yes    Take 100 mg by mouth Daily.    vitamin C (ASCORBIC ACID) 500 MG tablet  Nursing Home Yes Yes    Take 500 mg by mouth Daily.          Medication notes: Added melatonin and adjust some of the medication to PRN per staff at nursing home    This medication list is complete to the best of my knowledge as of 2/8/2020    Please call if questions.    Mauri Vale, PharmD, BCPS  Clinical Staff Pharmacist  2/8/2020 10:59 AM       yes

## 2021-08-04 NOTE — ASU PATIENT PROFILE, ADULT - TEACHING/LEARNING FACTORS IMPACT ABILITY TO LEARN
cognitive limitations/communication limitations/comprehension/language/learning disabilities/physical limitations

## 2021-08-04 NOTE — ASU PATIENT PROFILE, ADULT - PMH
Dental caries on smooth surface penetrating into dentin    GERD (gastroesophageal reflux disease)    H/O bacterial meningitis  @7 days old  History of hydrocephalus    History of seizures    Periodontal disease, unspecified

## 2021-08-04 NOTE — ASU PATIENT PROFILE, ADULT - PSH
History of Nissen fundoplication  1985  S/P  shunt  several last 1998  Status post breast lumpectomy  left; benign  Status post left foot surgery  heel cord lengthening

## 2021-08-04 NOTE — ASU PATIENT PROFILE, ADULT - PRESSURE ULCER(S)
Per chart review patient with medical history of HTN, HLD, afib, AICD (placed in 2014), CAD, PAD, DM2 c/b neuropathy and lung cancer, admitted for left foot gangrene. Patient's wife at bedside during time of visit. Wife reports patient with low appetite at baseline. Patient consumes 3 small meals daily; Breakfast: pastry, coffee, Lunch: sandwich, Dinner: meatloaf or chicken, pasta. Supplements with 1 boost oral nutrition supplement daily. HbA1c 5.8% (11/13). NKFA. Wife notes that patient has lost a significant amount of weight. Notes UBW of 165# which patient weighed "awhile ago." Patient with past admit to [facility] and weighed 134# in May 2019 (4/03). Current weight noted to be 124#. Wife notes that patient is stubborn and will not eat extra snacks, even when encouraged. Patient agreeable to Glucerna Shake supplement in-house. Provided diet education regarding high calorie/high protein foods. Expressed importance of adequate protein/calorie intake to prevent further decline in weight. No GI distress (nausea/vomiting/diarrhea/constipation) noted at this time. No chewing/swallowing difficulties reported.
no

## 2021-09-28 PROBLEM — K02.62 DENTAL CARIES ON SMOOTH SURFACE PENETRATING INTO DENTIN: Chronic | Status: ACTIVE | Noted: 2021-07-21

## 2021-09-28 PROBLEM — K05.6 PERIODONTAL DISEASE, UNSPECIFIED: Chronic | Status: ACTIVE | Noted: 2021-07-21

## 2021-09-28 PROBLEM — Z86.61 PERSONAL HISTORY OF INFECTIONS OF THE CENTRAL NERVOUS SYSTEM: Chronic | Status: ACTIVE | Noted: 2021-07-21

## 2021-09-28 PROBLEM — Z86.69 PERSONAL HISTORY OF OTHER DISEASES OF THE NERVOUS SYSTEM AND SENSE ORGANS: Chronic | Status: ACTIVE | Noted: 2021-07-21

## 2021-09-28 PROBLEM — Z87.898 PERSONAL HISTORY OF OTHER SPECIFIED CONDITIONS: Chronic | Status: ACTIVE | Noted: 2021-07-21

## 2021-09-28 PROBLEM — K21.9 GASTRO-ESOPHAGEAL REFLUX DISEASE WITHOUT ESOPHAGITIS: Chronic | Status: ACTIVE | Noted: 2021-07-21

## 2021-10-20 ENCOUNTER — APPOINTMENT (OUTPATIENT)
Dept: FAMILY MEDICINE | Facility: CLINIC | Age: 36
End: 2021-10-20
Payer: MEDICARE

## 2021-10-20 VITALS
HEIGHT: 60 IN | DIASTOLIC BLOOD PRESSURE: 62 MMHG | RESPIRATION RATE: 16 BRPM | TEMPERATURE: 98.3 F | BODY MASS INDEX: 21.79 KG/M2 | HEART RATE: 89 BPM | WEIGHT: 111 LBS | OXYGEN SATURATION: 98 % | SYSTOLIC BLOOD PRESSURE: 104 MMHG

## 2021-10-20 DIAGNOSIS — N93.9 ABNORMAL UTERINE AND VAGINAL BLEEDING, UNSPECIFIED: ICD-10-CM

## 2021-10-20 DIAGNOSIS — Z11.59 ENCOUNTER FOR SCREENING FOR OTHER VIRAL DISEASES: ICD-10-CM

## 2021-10-20 PROCEDURE — 99214 OFFICE O/P EST MOD 30 MIN: CPT | Mod: 25

## 2021-10-20 PROCEDURE — 36415 COLL VENOUS BLD VENIPUNCTURE: CPT

## 2021-10-20 NOTE — HISTORY OF PRESENT ILLNESS
[No Pertinent Cardiac History] : no history of aortic stenosis, atrial fibrillation, coronary artery disease, recent myocardial infarction, or implantable device/pacemaker [No Pertinent Pulmonary History] : no history of asthma, COPD, sleep apnea, or smoking [No Adverse Anesthesia Reaction] : no adverse anesthesia reaction in self or family member [(Patient denies any chest pain, claudication, dyspnea on exertion, orthopnea, palpitations or syncope)] : Patient denies any chest pain, claudication, dyspnea on exertion, orthopnea, palpitations or syncope [Chronic Anticoagulation] : no chronic anticoagulation [Chronic Kidney Disease] : no chronic kidney disease [Diabetes] : no diabetes [FreeTextEntry1] : D&C video Hysteroscopy with possible polypectomy [FreeTextEntry2] : 10/23/21 [FreeTextEntry3] : Dr Navarro at [facility] [FreeTextEntry4] : 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 Hx Shunts with several revisions (last 2008)\par Seen in the past by GI.\par 2008: Horse back riding accident, and since then is unable to walk independently with severe weight loss.\par Pt attends day care program at [facility] in [facility].\par According to mother, pt is a" happy child" .\par Pt is here today for medical clearance for Gyn procedure under anesthesia.\par

## 2021-10-20 NOTE — PHYSICAL EXAM
[Well Nourished] : well nourished [Well Developed] : well developed [Well-Appearing] : well-appearing [Normal Sclera/Conjunctiva] : normal sclera/conjunctiva [PERRL] : pupils equal round and reactive to light [EOMI] : extraocular movements intact [Normal Outer Ear/Nose] : the outer ears and nose were normal in appearance [Normal Oropharynx] : the oropharynx was normal [No JVD] : no jugular venous distention [No Lymphadenopathy] : no lymphadenopathy [Supple] : supple [Thyroid Normal, No Nodules] : the thyroid was normal and there were no nodules present [No Respiratory Distress] : no respiratory distress  [No Accessory Muscle Use] : no accessory muscle use [Clear to Auscultation] : lungs were clear to auscultation bilaterally [Normal Rate] : normal rate  [Regular Rhythm] : with a regular rhythm [Normal S1, S2] : normal S1 and S2 [No Murmur] : no murmur heard [No Carotid Bruits] : no carotid bruits [No Abdominal Bruit] : a ~M bruit was not heard ~T in the abdomen [No Varicosities] : no varicosities [Pedal Pulses Present] : the pedal pulses are present [No Edema] : there was no peripheral edema [No Palpable Aorta] : no palpable aorta [No Extremity Clubbing/Cyanosis] : no extremity clubbing/cyanosis [Soft] : abdomen soft [Non Tender] : non-tender [Non-distended] : non-distended [No Masses] : no abdominal mass palpated [No HSM] : no HSM [Normal Bowel Sounds] : normal bowel sounds [Normal Posterior Cervical Nodes] : no posterior cervical lymphadenopathy [Normal Anterior Cervical Nodes] : no anterior cervical lymphadenopathy [No CVA Tenderness] : no CVA  tenderness [No Spinal Tenderness] : no spinal tenderness [No Joint Swelling] : no joint swelling [Grossly Normal Strength/Tone] : grossly normal strength/tone [No Rash] : no rash [Normal Affect] : the affect was normal [Normal Insight/Judgement] : insight and judgment were intact [de-identified] : wheelchair bound/ Non verbal [de-identified] : non verbal. neurologic deficits. Non ambulatory

## 2021-10-20 NOTE — ASSESSMENT
[High Risk Surgery - Intraperitoneal, Intrathoracic or Supringuinal Vascular Procedures] : High Risk Surgery - Intraperitoneal, Intrathoracic or Supringuinal Vascular Procedures - No (0) [Ischemic Heart Disease] : Ischemic Heart Disease - No (0) [Congestive Heart Failure] : Congestive Heart Failure - No (0) [Prior Cerebrovascular Accident or TIA] : Prior Cerebrovascular Accident or TIA - No (0) [Creatinine >= 2mg/dL (1 Point)] : Creatinine >= 2mg/dL - No (0) [Insulin-dependent Diabetic (1 Point)] : Insulin-dependent Diabetic - No (0) [0] : 0 , RCRI Class: I, Risk of Post-Op Cardiac Complications: 3.9%, 95% CI for Risk Estimate: 2.8% - 5.4% [Patient Optimized for Surgery] : Patient optimized for surgery [No Further Testing Recommended] : no further testing recommended [Continue medications as is] : Continue current medications [As per surgery] : as per surgery [FreeTextEntry4] : Pt with no absolute contraindication  for surgical procedure. Clear from Clinical stand point.

## 2021-10-22 LAB
COVID-19 SPIKE DOMAIN ANTIBODY INTERPRETATION: POSITIVE
SARS-COV-2 AB SERPL IA-ACNC: >250 U/ML

## 2022-01-21 ENCOUNTER — NON-APPOINTMENT (OUTPATIENT)
Age: 37
End: 2022-01-21

## 2022-04-11 ENCOUNTER — NON-APPOINTMENT (OUTPATIENT)
Age: 37
End: 2022-04-11

## 2022-06-20 ENCOUNTER — NON-APPOINTMENT (OUTPATIENT)
Age: 37
End: 2022-06-20

## 2022-07-19 NOTE — PRE-ANESTHESIA EVALUATION ADULT - LAST STRESS TEST
Subjective:       Patient ID: Camila Rizzo Jr. is a 64 y.o. male.    Chief Complaint: Chest Pain (For about three weeks)    Chest Pain   This is a new problem. The current episode started 1 to 4 weeks ago (3 weeks ago). The onset quality is sudden. The problem occurs every several days. The problem has been unchanged. The pain is present in the substernal region and epigastric region. The pain is at a severity of 4/10. The pain is mild. The quality of the pain is described as stabbing. The pain does not radiate. Associated symptoms include abdominal pain. Pertinent negatives include no cough, fever, headaches, hemoptysis, irregular heartbeat, lower extremity edema, nausea, near-syncope, palpitations, shortness of breath, vomiting or weakness. The pain is aggravated by food. He has tried antacids for the symptoms. The treatment provided mild relief. Risk factors include male gender.   His past medical history is significant for hyperlipidemia.   Pertinent negatives for past medical history include no CAD and no hypertension.   Pertinent negatives for family medical history include: no CAD and no sudden death.     Review of Systems   Constitutional: Positive for weight loss. Negative for fever.   Respiratory: Negative for cough, hemoptysis and shortness of breath.    Cardiovascular: Positive for chest pain. Negative for palpitations and near-syncope.   Gastrointestinal: Positive for abdominal pain. Negative for anorexia, constipation, diarrhea, flatus, hematochezia, melena, nausea and vomiting.   Genitourinary: Negative for dysuria, frequency and hematuria.   Musculoskeletal: Negative for arthralgias and myalgias.   Allergic/Immunologic: Negative for immunocompromised state.   Neurological: Negative for weakness and headaches.       Objective:      Vitals:    07/19/22 1220   BP: 124/62   BP Location: Right arm   Patient Position: Sitting   BP Method: Medium (Manual)   Pulse: 76   Resp: 18   SpO2: 96%   Weight: 80.4  kg (177 lb 4 oz)   Height: 6' (1.829 m)     Physical Exam  Vitals and nursing note reviewed.   Constitutional:       Appearance: He is well-developed.   HENT:      Head: Normocephalic and atraumatic.   Cardiovascular:      Rate and Rhythm: Normal rate and regular rhythm.      Heart sounds: Normal heart sounds.   Pulmonary:      Effort: Pulmonary effort is normal.      Breath sounds: Normal breath sounds.   Abdominal:      General: Bowel sounds are normal. There is no distension.      Tenderness: There is no abdominal tenderness. There is no guarding.   Musculoskeletal:      Right lower leg: No edema.      Left lower leg: No edema.   Skin:     General: Skin is warm and dry.   Neurological:      Mental Status: He is alert and oriented to person, place, and time.   Psychiatric:         Mood and Affect: Mood normal.         Behavior: Behavior normal.         Lab Results   Component Value Date    WBC 7.9 11/17/2020    HGB 13.9 11/17/2020    HCT 43.1 11/17/2020     11/17/2020    CHOL 212 (H) 11/17/2020    TRIG 131 11/17/2020    HDL 52 11/17/2020    ALT 17 11/17/2020    AST 18 11/17/2020     11/17/2020    K 4.0 11/17/2020     11/17/2020    CREATININE 1.06 11/17/2020    BUN 17 11/17/2020    CO2 27 11/17/2020    PSA 1.7 11/14/2018      Assessment:       1. Atypical chest pain        Plan:       Atypical chest pain  Features most suggestive of noncardiac etiology, likely GI.  Continue famotidine at bedtime, add PPI for 30 days.  Will message patient for an update next week.  If no improvement, will likely need stress test.  If symptoms recur after completing 30 days of therapy, may need EGD.  Other orders  -     pantoprazole (PROTONIX) 40 MG tablet; Take 1 tablet (40 mg total) by mouth once daily.  Dispense: 30 tablet; Refill: 0      Medication List with Changes/Refills   New Medications    PANTOPRAZOLE (PROTONIX) 40 MG TABLET    Take 1 tablet (40 mg total) by mouth once daily.   Current Medications     ASPIRIN (ECOTRIN) 81 MG EC TABLET    Take 81 mg by mouth once daily.    CALCIUM CARBONATE (CALCIUM 600 ORAL)    Take 1 tablet by mouth once daily.    FAMOTIDINE (PEPCID) 40 MG TABLET    Take 1 tablet (40 mg total) by mouth once daily.    LATANOPROST 0.005 % OPHTHALMIC SOLUTION    Place 1 drop into both eyes nightly.    MULTIVITAMIN (THERAGRAN) PER TABLET    Take 1 tablet by mouth once daily.    VITAMIN E 400 UNIT CAPSULE    Take 400 Units by mouth once daily.            ?

## 2022-08-23 ENCOUNTER — APPOINTMENT (OUTPATIENT)
Dept: FAMILY MEDICINE | Facility: CLINIC | Age: 37
End: 2022-08-23

## 2022-08-23 VITALS
TEMPERATURE: 97 F | HEIGHT: 60 IN | BODY MASS INDEX: 23.56 KG/M2 | SYSTOLIC BLOOD PRESSURE: 110 MMHG | RESPIRATION RATE: 12 BRPM | DIASTOLIC BLOOD PRESSURE: 70 MMHG | HEART RATE: 79 BPM | OXYGEN SATURATION: 98 % | WEIGHT: 120 LBS

## 2022-08-23 PROCEDURE — G0438: CPT

## 2022-08-23 PROCEDURE — 36415 COLL VENOUS BLD VENIPUNCTURE: CPT

## 2022-08-23 NOTE — HISTORY OF PRESENT ILLNESS
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 Hx Shunts with several revisions (last 2008)\par Seen in the past by GI.\par 2008: Horse back riding accident, and since then is unable to walk independently with severe weight loss.\par Pt attends day care program at [facility] in [facility].\par According to mother, pt is a" happy child".\par

## 2022-08-23 NOTE — ASSESSMENT
[FreeTextEntry1] : Pt currently stable >need of Home Care services. \par No need for medication refills at this time\par Continue current regimen\par Follow up q 6 months\par Blood drawn today.\par Continue regular f/up with Neurology.,\par

## 2022-08-23 NOTE — PHYSICAL EXAM
[Normal] : soft, non-tender, non-distended, no masses palpated, no HSM and normal bowel sounds [de-identified] : wheelchair bound [de-identified] : Non verbal, no repond to simple orders.

## 2022-08-23 NOTE — HEALTH RISK ASSESSMENT
[Good] : ~his/her~  mood as  good [Never] : Never [No] : In the past 12 months have you used drugs other than those required for medical reasons? No [Patient not ambulatory] : Patient is not ambulatory [Medical reason not done] : Medical reason not done [Patient declined mammogram] : Patient declined mammogram [Patient declined PAP Smear] : Patient declined PAP Smear [Patient declined bone density test] : Patient declined bone density test [Patient declined colonoscopy] : Patient declined colonoscopy [HIV test declined] : HIV test declined [Hepatitis C test declined] : Hepatitis C test declined [None] : None [With Family] : lives with family [On disability] : on disability [Less Than High School] : less than high school [Single] : single [Feels Safe at Home] : Feels safe at home [Full assistance needed] : managing finances [Smoke Detector] : smoke detector [Safety elements used in home] : safety elements used in home [Seat Belt] :  uses seat belt [With Patient/Caregiver] : , with patient/caregiver [Designated Healthcare Proxy] : Designated healthcare proxy [Relationship: ___] : Relationship: [unfilled] [de-identified] : wheelchair bound [de-identified] : mental disabled [Change in mental status noted] : No change in mental status noted [Sexually Active] : not sexually active [Reports changes in hearing] : Reports no changes in hearing [Reports changes in vision] : Reports no changes in vision [Reports changes in dental health] : Reports no changes in dental health [TB Exposure] : is not being exposed to tuberculosis [de-identified] : Unable to asses, pt non verbal [AdvancecareDate] : 08/22

## 2022-08-23 NOTE — COUNSELING
[Fall prevention counseling provided] : Fall prevention counseling provided [Adequate lighting] : Adequate lighting [Behavioral health counseling provided] : Behavioral health counseling provided [None] : None [Good understanding] : Patient has a good understanding of lifestyle changes and steps needed to achieve self management goal

## 2022-08-29 ENCOUNTER — NON-APPOINTMENT (OUTPATIENT)
Age: 37
End: 2022-08-29

## 2022-08-29 LAB
25(OH)D3 SERPL-MCNC: 121 NG/ML
ALBUMIN SERPL ELPH-MCNC: 4.6 G/DL
ALP BLD-CCNC: 79 U/L
ALT SERPL-CCNC: 24 U/L
ANION GAP SERPL CALC-SCNC: 13 MMOL/L
AST SERPL-CCNC: 14 U/L
BASOPHILS # BLD AUTO: 0.02 K/UL
BASOPHILS NFR BLD AUTO: 0.4 %
BILIRUB SERPL-MCNC: <0.2 MG/DL
BUN SERPL-MCNC: 18 MG/DL
CALCIUM SERPL-MCNC: 9.1 MG/DL
CARBAMAZEPINE SERPL-MCNC: 13.6 UG/ML
CHLORIDE SERPL-SCNC: 110 MMOL/L
CHOLEST SERPL-MCNC: 188 MG/DL
CO2 SERPL-SCNC: 20 MMOL/L
COVID-19 SPIKE DOMAIN ANTIBODY INTERPRETATION: POSITIVE
CREAT SERPL-MCNC: 0.68 MG/DL
EGFR: 116 ML/MIN/1.73M2
EOSINOPHIL # BLD AUTO: 0 K/UL
EOSINOPHIL NFR BLD AUTO: 0 %
ESTIMATED AVERAGE GLUCOSE: 97 MG/DL
FOLATE SERPL-MCNC: >20 NG/ML
GLUCOSE SERPL-MCNC: 100 MG/DL
HBA1C MFR BLD HPLC: 5 %
HCT VFR BLD CALC: 43.3 %
HDLC SERPL-MCNC: 79 MG/DL
HGB BLD-MCNC: 14 G/DL
IMM GRANULOCYTES NFR BLD AUTO: 0.2 %
LDLC SERPL CALC-MCNC: 75 MG/DL
LYMPHOCYTES # BLD AUTO: 1.11 K/UL
LYMPHOCYTES NFR BLD AUTO: 19.4 %
MAN DIFF?: NORMAL
MCHC RBC-ENTMCNC: 31.3 PG
MCHC RBC-ENTMCNC: 32.3 GM/DL
MCV RBC AUTO: 96.9 FL
MONOCYTES # BLD AUTO: 0.5 K/UL
MONOCYTES NFR BLD AUTO: 8.8 %
NEUTROPHILS # BLD AUTO: 4.07 K/UL
NEUTROPHILS NFR BLD AUTO: 71.2 %
NONHDLC SERPL-MCNC: 109 MG/DL
PLATELET # BLD AUTO: 234 K/UL
POTASSIUM SERPL-SCNC: 4 MMOL/L
PROT SERPL-MCNC: 6.9 G/DL
RBC # BLD: 4.47 M/UL
RBC # FLD: 12.9 %
SARS-COV-2 AB SERPL IA-ACNC: >250 U/ML
SODIUM SERPL-SCNC: 143 MMOL/L
TRIGL SERPL-MCNC: 169 MG/DL
TSH SERPL-ACNC: 1.05 UIU/ML
VIT B12 SERPL-MCNC: 1100 PG/ML
WBC # FLD AUTO: 5.71 K/UL

## 2022-10-20 ENCOUNTER — NON-APPOINTMENT (OUTPATIENT)
Age: 37
End: 2022-10-20

## 2022-10-21 NOTE — ASU PREOP CHECKLIST - AICD PRESENT
Subjective:     CC: anticoagulation    Octavio Key is a 71 y.o. male who presents today to follow up for chronic anticoagulation. He takes Coumadin for hx of DVT back in 2011. INR at the last visit was elevated at 3.5 and he stated this is probably because he decided on his own to take 3mg every day. He was advised to cont taking 3mg daily except one day a week take 2mg. Today INR is at goal, 3.0. He scraped his left shin 2 weeks ago but it has healed now. Patient Active Problem List   Diagnosis Code    Long term (current) use of anticoagulants Z79.01    Depression F32. A    Impaired glucose tolerance R73.02    DJD (degenerative joint disease) M19.90    GERD (gastroesophageal reflux disease) K21.9    Anxiety F41.9    BPH (benign prostatic hyperplasia) N40.0    History of DVT (deep vein thrombosis) Z86.718    Hypertriglyceridemia E78.1    Lupus anticoagulant with hypercoagulable state ([facility] [address] D68.62    Tendinitis of right wrist M77.8    Poor dentition K08.9       Past Medical History:   Diagnosis Date    Anxiety     BPH (benign prostatic hyperplasia)     Chronic anticoagulation     Depression     DJD (degenerative joint disease)     DVT of leg (deep venous thrombosis) (HCC)     ED (erectile dysfunction)     GERD (gastroesophageal reflux disease)     Hypercoagulable state (HCC)     lupus coag    Impaired glucose tolerance          Current Outpatient Medications:     warfarin (COUMADIN) 3 mg tablet, Take 3mg daily except once a week take 2mg daily, Disp: 60 Tablet, Rfl: 0    warfarin (COUMADIN) 2 mg tablet, TAKE 1 TABLET BY MOUTH ONCE A WEEK, Disp: 30 Tablet, Rfl: 0    tamsulosin (Flomax) 0.4 mg capsule, Take 2 Capsules by mouth daily. , Disp: 180 Capsule, Rfl: 1    diazePAM (VALIUM) 10 mg tablet, Take 1 Tablet by mouth four (4) times daily as needed for Anxiety.  Max Daily Amount: 40 mg., Disp: 120 Tablet, Rfl: 5    terazosin (HYTRIN) 5 mg capsule, Take 1 Capsule by mouth nightly., Disp: 90 Capsule, Rfl: 1    cyclobenzaprine (FLEXERIL) 10 mg tablet, TAKE 1 TABLET BY MOUTH THREE (3) TIMES DAILY AS NEEDED FOR MUSCLE SPASMS, Disp: 90 Tablet, Rfl: 1    mirtazapine (REMERON) 30 mg tablet, Take 1 Tablet by mouth nightly., Disp: 90 Tablet, Rfl: 3    hyoscyamine (ANASPAZ, LEVSIN) 0.125 mg tablet, TAKE 1 TABLET BY MOUTH EVERY 4 HOURS AS NEEDED FOR PAIN, Disp: , Rfl:     polyethylene glycol (MIRALAX) 17 gram/dose powder, Take 17 g by mouth daily. , Disp: 527 g, Rfl: 3    Allergies   Allergen Reactions    Haldol [Haloperidol Lactate] Rash       Past Surgical History:   Procedure Laterality Date    HX ENDOSCOPY  2011    NH ABDOMEN SURGERY PROC UNLISTED  2011    ischemic bowel d/t hypercoagulable state       Social History     Tobacco Use   Smoking Status Never   Smokeless Tobacco Never       Social History     Socioeconomic History    Marital status:    Tobacco Use    Smoking status: Never    Smokeless tobacco: Never   Vaping Use    Vaping Use: Never used   Substance and Sexual Activity    Alcohol use: No    Drug use: Yes     Types: Marijuana    Sexual activity: Yes       Family History   Problem Relation Age of Onset    Heart Disease Father     Heart Disease Brother        ROS:  Gen: denies fever, chills, or fatigue  HEENT:denies H/A, nasal congestion, or sore throat  Resp: denies dyspnea, cough, or wheezing  CV: denies chest pain, pressure, or palpitations  Extremeties: denies edema,  GI[de-identified] denies hematochezia or melena  : denies hematuria  Neuro: denies numbness/tingling or dizziness  Skin: +skin tear to left shin- healed well     Objective:     Visit Vitals  /79 (BP 1 Location: Left upper arm, BP Patient Position: Sitting, BP Cuff Size: Adult)   Pulse 70   Temp 97 °F (36.1 °C) (Temporal)   Resp 18   Ht 5' 8\" (1.727 m)   SpO2 97%   BMI 32.08 kg/m²       General: Alert and oriented. No acute distress. Well nourished. HEENT :  Eyes: Sclera white, conjunctiva clear. PERRLA. Extra ocular movements intact.    Neck: Supple with FROM. Lungs: Breathing even and unlabored. All lobes clear to auscultation bilaterally   Heart :RRR, S1 and S2 normal intensity, no extra heart sounds  Extremities: Non-edematous. Neuro: Cranial nerves grossly normal.  Psych: Mood and thought content appropriate for situation. Dressed appropriately and with good hygiene. Skin: Warm and dry and intact    Assessment/ Plan:     Chronic anticoagulation  INR today is at goal, 3.0   Cont Coumadin 3mg daily except for once a week take 2mg  Notify provider or go to ER for s/s of abnormal bleeding  F/U 1 month    Skin tear  Resolved          Verbal and written instructions (see AVS) provided. Patient expresses understanding of diagnosis and treatment plan. Health Maintenance Due   Topic Date Due    DTaP/Tdap/Td series (1 - Tdap) Never done    Colorectal Cancer Screening Combo  Never done    Shingrix Vaccine Age 50> (1 of 2) Never done    Pneumococcal 65+ years (1 - PCV) Never done    COVID-19 Vaccine (3 - Booster for Moderna series) 01/07/2022    Flu Vaccine (1) Never done               Triston Castle NP no

## 2022-11-17 ENCOUNTER — NON-APPOINTMENT (OUTPATIENT)
Age: 37
End: 2022-11-17

## 2023-01-12 ENCOUNTER — NON-APPOINTMENT (OUTPATIENT)
Age: 38
End: 2023-01-12

## 2023-02-12 ENCOUNTER — INPATIENT (INPATIENT)
Facility: HOSPITAL | Age: 38
LOS: 3 days | Discharge: HOME | DRG: 871 | End: 2023-02-16
Admitting: HOSPITALIST
Payer: MEDICARE

## 2023-02-12 VITALS
RESPIRATION RATE: 18 BRPM | DIASTOLIC BLOOD PRESSURE: 88 MMHG | HEART RATE: 116 BPM | WEIGHT: 110.01 LBS | SYSTOLIC BLOOD PRESSURE: 126 MMHG | TEMPERATURE: 98 F | OXYGEN SATURATION: 98 %

## 2023-02-12 DIAGNOSIS — L02.91 CUTANEOUS ABSCESS, UNSPECIFIED: ICD-10-CM

## 2023-02-12 DIAGNOSIS — Z98.890 OTHER SPECIFIED POSTPROCEDURAL STATES: Chronic | ICD-10-CM

## 2023-02-12 DIAGNOSIS — Z98.2 PRESENCE OF CEREBROSPINAL FLUID DRAINAGE DEVICE: Chronic | ICD-10-CM

## 2023-02-12 LAB
ALBUMIN SERPL ELPH-MCNC: 3.8 G/DL — SIGNIFICANT CHANGE UP (ref 3.3–5.2)
ALP SERPL-CCNC: 73 U/L — SIGNIFICANT CHANGE UP (ref 40–120)
ALT FLD-CCNC: 13 U/L — SIGNIFICANT CHANGE UP
ANION GAP SERPL CALC-SCNC: 13 MMOL/L — SIGNIFICANT CHANGE UP (ref 5–17)
APPEARANCE CSF: ABNORMAL
APPEARANCE SPUN FLD: COLORLESS — SIGNIFICANT CHANGE UP
APPEARANCE UR: ABNORMAL
APTT BLD: 26.1 SEC — LOW (ref 27.5–35.5)
AST SERPL-CCNC: 17 U/L — SIGNIFICANT CHANGE UP
BACTERIA # UR AUTO: ABNORMAL
BASE EXCESS BLDV CALC-SCNC: -5.3 MMOL/L — LOW (ref -2–3)
BASOPHILS # BLD AUTO: 0.02 K/UL — SIGNIFICANT CHANGE UP (ref 0–0.2)
BASOPHILS NFR BLD AUTO: 0.2 % — SIGNIFICANT CHANGE UP (ref 0–2)
BILIRUB SERPL-MCNC: 0.3 MG/DL — LOW (ref 0.4–2)
BILIRUB UR-MCNC: NEGATIVE — SIGNIFICANT CHANGE UP
BUN SERPL-MCNC: 17.6 MG/DL — SIGNIFICANT CHANGE UP (ref 8–20)
CA-I SERPL-SCNC: 1.14 MMOL/L — LOW (ref 1.15–1.33)
CALCIUM SERPL-MCNC: 8.9 MG/DL — SIGNIFICANT CHANGE UP (ref 8.4–10.5)
CHLORIDE BLDV-SCNC: 109 MMOL/L — HIGH (ref 96–108)
CHLORIDE SERPL-SCNC: 109 MMOL/L — HIGH (ref 96–108)
CO2 SERPL-SCNC: 19 MMOL/L — LOW (ref 22–29)
COLOR CSF: SIGNIFICANT CHANGE UP
COLOR SPEC: YELLOW — SIGNIFICANT CHANGE UP
CREAT SERPL-MCNC: 0.56 MG/DL — SIGNIFICANT CHANGE UP (ref 0.5–1.3)
CRYPTOC AG CSF-ACNC: NEGATIVE — SIGNIFICANT CHANGE UP
DIFF PNL FLD: ABNORMAL
EGFR: 120 ML/MIN/1.73M2 — SIGNIFICANT CHANGE UP
EOSINOPHIL # BLD AUTO: 0 K/UL — SIGNIFICANT CHANGE UP (ref 0–0.5)
EOSINOPHIL NFR BLD AUTO: 0 % — SIGNIFICANT CHANGE UP (ref 0–6)
EPI CELLS # UR: ABNORMAL
FLUAV AG NPH QL: SIGNIFICANT CHANGE UP
FLUBV AG NPH QL: SIGNIFICANT CHANGE UP
GAS PNL BLDV: 139 MMOL/L — SIGNIFICANT CHANGE UP (ref 136–145)
GAS PNL BLDV: SIGNIFICANT CHANGE UP
GLUCOSE BLDV-MCNC: 124 MG/DL — HIGH (ref 70–99)
GLUCOSE CSF-MCNC: 82 MG/DLG/24H — HIGH (ref 40–70)
GLUCOSE SERPL-MCNC: 122 MG/DL — HIGH (ref 70–99)
GLUCOSE UR QL: NEGATIVE — SIGNIFICANT CHANGE UP
GRAM STN FLD: SIGNIFICANT CHANGE UP
HCG SERPL-ACNC: <4 MIU/ML — SIGNIFICANT CHANGE UP
HCO3 BLDV-SCNC: 21 MMOL/L — LOW (ref 22–29)
HCT VFR BLD CALC: 42.5 % — SIGNIFICANT CHANGE UP (ref 34.5–45)
HCT VFR BLDA CALC: 43 % — SIGNIFICANT CHANGE UP
HGB BLD CALC-MCNC: 14.4 G/DL — SIGNIFICANT CHANGE UP (ref 11.7–16.1)
HGB BLD-MCNC: 13.9 G/DL — SIGNIFICANT CHANGE UP (ref 11.5–15.5)
IMM GRANULOCYTES NFR BLD AUTO: 0.3 % — SIGNIFICANT CHANGE UP (ref 0–0.9)
INR BLD: 1.41 RATIO — HIGH (ref 0.88–1.16)
KETONES UR-MCNC: ABNORMAL
LACTATE BLDV-MCNC: 2.2 MMOL/L — HIGH (ref 0.5–2)
LACTATE SERPL-SCNC: 1.3 MMOL/L — SIGNIFICANT CHANGE UP (ref 0.5–2)
LEUKOCYTE ESTERASE UR-ACNC: ABNORMAL
LYMPHOCYTES # BLD AUTO: 0.85 K/UL — LOW (ref 1–3.3)
LYMPHOCYTES # BLD AUTO: 8.7 % — LOW (ref 13–44)
MCHC RBC-ENTMCNC: 30.4 PG — SIGNIFICANT CHANGE UP (ref 27–34)
MCHC RBC-ENTMCNC: 32.7 GM/DL — SIGNIFICANT CHANGE UP (ref 32–36)
MCV RBC AUTO: 93 FL — SIGNIFICANT CHANGE UP (ref 80–100)
MONOCYTES # BLD AUTO: 0.99 K/UL — HIGH (ref 0–0.9)
MONOCYTES NFR BLD AUTO: 10.1 % — SIGNIFICANT CHANGE UP (ref 2–14)
NEUTROPHILS # BLD AUTO: 7.92 K/UL — HIGH (ref 1.8–7.4)
NEUTROPHILS # CSF: SIGNIFICANT CHANGE UP % (ref 0–6)
NEUTROPHILS NFR BLD AUTO: 80.7 % — HIGH (ref 43–77)
NITRITE UR-MCNC: POSITIVE
NRBC NFR CSF: 4 /UL — SIGNIFICANT CHANGE UP (ref 0–5)
PCO2 BLDV: 39 MMHG — SIGNIFICANT CHANGE UP (ref 39–42)
PH BLDV: 7.33 — SIGNIFICANT CHANGE UP (ref 7.32–7.43)
PH UR: 8 — SIGNIFICANT CHANGE UP (ref 5–8)
PLATELET # BLD AUTO: 185 K/UL — SIGNIFICANT CHANGE UP (ref 150–400)
PO2 BLDV: 114 MMHG — HIGH (ref 25–45)
POTASSIUM BLDV-SCNC: 3.9 MMOL/L — SIGNIFICANT CHANGE UP (ref 3.5–5.1)
POTASSIUM SERPL-MCNC: 4.2 MMOL/L — SIGNIFICANT CHANGE UP (ref 3.5–5.3)
POTASSIUM SERPL-SCNC: 4.2 MMOL/L — SIGNIFICANT CHANGE UP (ref 3.5–5.3)
PROT CSF-MCNC: 31 MG/DL — SIGNIFICANT CHANGE UP (ref 15–45)
PROT SERPL-MCNC: 7.5 G/DL — SIGNIFICANT CHANGE UP (ref 6.6–8.7)
PROT UR-MCNC: 30 MG/DL
PROTHROM AB SERPL-ACNC: 16.4 SEC — HIGH (ref 10.5–13.4)
RBC # BLD: 4.57 M/UL — SIGNIFICANT CHANGE UP (ref 3.8–5.2)
RBC # CSF: 1450 /CMM — HIGH (ref 0–1)
RBC # FLD: 13.2 % — SIGNIFICANT CHANGE UP (ref 10.3–14.5)
RBC CASTS # UR COMP ASSIST: SIGNIFICANT CHANGE UP /HPF (ref 0–4)
RSV RNA NPH QL NAA+NON-PROBE: SIGNIFICANT CHANGE UP
SAO2 % BLDV: 99.6 % — SIGNIFICANT CHANGE UP
SARS-COV-2 RNA SPEC QL NAA+PROBE: SIGNIFICANT CHANGE UP
SODIUM SERPL-SCNC: 141 MMOL/L — SIGNIFICANT CHANGE UP (ref 135–145)
SP GR SPEC: 1.01 — SIGNIFICANT CHANGE UP (ref 1.01–1.02)
TROPONIN T SERPL-MCNC: <0.01 NG/ML — SIGNIFICANT CHANGE UP (ref 0–0.06)
TUBE TYPE: SIGNIFICANT CHANGE UP
UROBILINOGEN FLD QL: 4
WBC # BLD: 9.81 K/UL — SIGNIFICANT CHANGE UP (ref 3.8–10.5)
WBC # FLD AUTO: 9.81 K/UL — SIGNIFICANT CHANGE UP (ref 3.8–10.5)
WBC UR QL: ABNORMAL /HPF (ref 0–5)

## 2023-02-12 PROCEDURE — 70450 CT HEAD/BRAIN W/O DYE: CPT | Mod: 26,MA

## 2023-02-12 PROCEDURE — 99222 1ST HOSP IP/OBS MODERATE 55: CPT

## 2023-02-12 PROCEDURE — 74177 CT ABD & PELVIS W/CONTRAST: CPT | Mod: 26,MA

## 2023-02-12 PROCEDURE — 71045 X-RAY EXAM CHEST 1 VIEW: CPT | Mod: 26

## 2023-02-12 PROCEDURE — 70250 X-RAY EXAM OF SKULL: CPT | Mod: 26

## 2023-02-12 PROCEDURE — 74018 RADEX ABDOMEN 1 VIEW: CPT | Mod: 26

## 2023-02-12 PROCEDURE — 99285 EMERGENCY DEPT VISIT HI MDM: CPT | Mod: GC

## 2023-02-12 PROCEDURE — 99223 1ST HOSP IP/OBS HIGH 75: CPT

## 2023-02-12 PROCEDURE — 62270 DX LMBR SPI PNXR: CPT

## 2023-02-12 RX ORDER — METRONIDAZOLE 500 MG
500 TABLET ORAL EVERY 8 HOURS
Refills: 0 | Status: DISCONTINUED | OUTPATIENT
Start: 2023-02-13 | End: 2023-02-15

## 2023-02-12 RX ORDER — VANCOMYCIN HCL 1 G
1000 VIAL (EA) INTRAVENOUS EVERY 12 HOURS
Refills: 0 | Status: DISCONTINUED | OUTPATIENT
Start: 2023-02-13 | End: 2023-02-15

## 2023-02-12 RX ORDER — FOLIC ACID 0.8 MG
1 TABLET ORAL
Qty: 0 | Refills: 0 | DISCHARGE

## 2023-02-12 RX ORDER — LANOLIN ALCOHOL/MO/W.PET/CERES
3 CREAM (GRAM) TOPICAL AT BEDTIME
Refills: 0 | Status: DISCONTINUED | OUTPATIENT
Start: 2023-02-12 | End: 2023-02-16

## 2023-02-12 RX ORDER — SODIUM CHLORIDE 9 MG/ML
1000 INJECTION, SOLUTION INTRAVENOUS
Refills: 0 | Status: DISCONTINUED | OUTPATIENT
Start: 2023-02-12 | End: 2023-02-13

## 2023-02-12 RX ORDER — ACETAMINOPHEN 500 MG
650 TABLET ORAL EVERY 6 HOURS
Refills: 0 | Status: DISCONTINUED | OUTPATIENT
Start: 2023-02-12 | End: 2023-02-16

## 2023-02-12 RX ORDER — IOHEXOL 300 MG/ML
30 INJECTION, SOLUTION INTRAVENOUS ONCE
Refills: 0 | Status: COMPLETED | OUTPATIENT
Start: 2023-02-12 | End: 2023-02-12

## 2023-02-12 RX ORDER — VANCOMYCIN HCL 1 G
1000 VIAL (EA) INTRAVENOUS ONCE
Refills: 0 | Status: COMPLETED | OUTPATIENT
Start: 2023-02-12 | End: 2023-02-12

## 2023-02-12 RX ORDER — ASCORBIC ACID 60 MG
1 TABLET,CHEWABLE ORAL
Qty: 0 | Refills: 0 | DISCHARGE

## 2023-02-12 RX ORDER — CEFEPIME 1 G/1
2000 INJECTION, POWDER, FOR SOLUTION INTRAMUSCULAR; INTRAVENOUS EVERY 8 HOURS
Refills: 0 | Status: DISCONTINUED | OUTPATIENT
Start: 2023-02-12 | End: 2023-02-12

## 2023-02-12 RX ORDER — ACETAZOLAMIDE 250 MG/1
250 TABLET ORAL
Refills: 0 | Status: DISCONTINUED | OUTPATIENT
Start: 2023-02-13 | End: 2023-02-16

## 2023-02-12 RX ORDER — ENOXAPARIN SODIUM 100 MG/ML
40 INJECTION SUBCUTANEOUS EVERY 24 HOURS
Refills: 0 | Status: DISCONTINUED | OUTPATIENT
Start: 2023-02-12 | End: 2023-02-12

## 2023-02-12 RX ORDER — SODIUM CHLORIDE 9 MG/ML
1550 INJECTION INTRAMUSCULAR; INTRAVENOUS; SUBCUTANEOUS ONCE
Refills: 0 | Status: COMPLETED | OUTPATIENT
Start: 2023-02-12 | End: 2023-02-12

## 2023-02-12 RX ORDER — ACETAMINOPHEN 500 MG
750 TABLET ORAL ONCE
Refills: 0 | Status: COMPLETED | OUTPATIENT
Start: 2023-02-12 | End: 2023-02-13

## 2023-02-12 RX ORDER — CEFEPIME 1 G/1
2000 INJECTION, POWDER, FOR SOLUTION INTRAMUSCULAR; INTRAVENOUS EVERY 8 HOURS
Refills: 0 | Status: DISCONTINUED | OUTPATIENT
Start: 2023-02-12 | End: 2023-02-15

## 2023-02-12 RX ORDER — POLYETHYLENE GLYCOL 3350 17 G/17G
17 POWDER, FOR SOLUTION ORAL ONCE
Refills: 0 | Status: COMPLETED | OUTPATIENT
Start: 2023-02-12 | End: 2023-02-12

## 2023-02-12 RX ORDER — SODIUM CHLORIDE 9 MG/ML
1550 INJECTION, SOLUTION INTRAVENOUS ONCE
Refills: 0 | Status: COMPLETED | OUTPATIENT
Start: 2023-02-12 | End: 2023-02-12

## 2023-02-12 RX ORDER — METRONIDAZOLE 500 MG
500 TABLET ORAL ONCE
Refills: 0 | Status: COMPLETED | OUTPATIENT
Start: 2023-02-12 | End: 2023-02-13

## 2023-02-12 RX ORDER — ZONISAMIDE 100 MG
25 CAPSULE ORAL
Refills: 0 | Status: DISCONTINUED | OUTPATIENT
Start: 2023-02-13 | End: 2023-02-16

## 2023-02-12 RX ORDER — ONDANSETRON 8 MG/1
4 TABLET, FILM COATED ORAL EVERY 8 HOURS
Refills: 0 | Status: DISCONTINUED | OUTPATIENT
Start: 2023-02-12 | End: 2023-02-16

## 2023-02-12 RX ORDER — PIPERACILLIN AND TAZOBACTAM 4; .5 G/20ML; G/20ML
3.38 INJECTION, POWDER, LYOPHILIZED, FOR SOLUTION INTRAVENOUS ONCE
Refills: 0 | Status: COMPLETED | OUTPATIENT
Start: 2023-02-12 | End: 2023-02-12

## 2023-02-12 RX ORDER — CARBAMAZEPINE 200 MG
600 TABLET ORAL
Refills: 0 | Status: DISCONTINUED | OUTPATIENT
Start: 2023-02-13 | End: 2023-02-16

## 2023-02-12 RX ORDER — METRONIDAZOLE 500 MG
TABLET ORAL
Refills: 0 | Status: DISCONTINUED | OUTPATIENT
Start: 2023-02-13 | End: 2023-02-15

## 2023-02-12 RX ORDER — DIAZEPAM 5 MG
2 TABLET ORAL
Refills: 0 | Status: DISCONTINUED | OUTPATIENT
Start: 2023-02-13 | End: 2023-02-16

## 2023-02-12 RX ADMIN — SODIUM CHLORIDE 1550 MILLILITER(S): 9 INJECTION, SOLUTION INTRAVENOUS at 11:22

## 2023-02-12 RX ADMIN — SODIUM CHLORIDE 1550 MILLILITER(S): 9 INJECTION, SOLUTION INTRAVENOUS at 16:14

## 2023-02-12 RX ADMIN — Medication 250 MILLIGRAM(S): at 20:44

## 2023-02-12 RX ADMIN — ENOXAPARIN SODIUM 40 MILLIGRAM(S): 100 INJECTION SUBCUTANEOUS at 22:03

## 2023-02-12 RX ADMIN — PIPERACILLIN AND TAZOBACTAM 200 GRAM(S): 4; .5 INJECTION, POWDER, LYOPHILIZED, FOR SOLUTION INTRAVENOUS at 18:27

## 2023-02-12 RX ADMIN — IOHEXOL 30 MILLILITER(S): 300 INJECTION, SOLUTION INTRAVENOUS at 14:23

## 2023-02-12 RX ADMIN — CEFEPIME 2000 MILLIGRAM(S): 1 INJECTION, POWDER, FOR SOLUTION INTRAMUSCULAR; INTRAVENOUS at 22:02

## 2023-02-12 RX ADMIN — POLYETHYLENE GLYCOL 3350 17 GRAM(S): 17 POWDER, FOR SOLUTION ORAL at 11:22

## 2023-02-12 RX ADMIN — SODIUM CHLORIDE 1550 MILLILITER(S): 9 INJECTION INTRAMUSCULAR; INTRAVENOUS; SUBCUTANEOUS at 21:37

## 2023-02-12 NOTE — CONSULT NOTE ADULT - ATTENDING COMMENTS
37y Female with PMH of meningitis, non-verbal, hydrocephalus, and multiple  shunts presents to the ED with 2 days of decreased appetite and weakness noted by her mother. Mother at bedside reports that she has not been eating and every time she stands her up to walk she bends her knees. Patient has constipation at baseline and got a suppository on Friday 2/10 that helped her to have BM.  Question of a surgical sepsis arises.  Patient is awake and non verbal  Bilateral BS RR 18-20  Hemodynamic intact  Abdomen is soft, but tender suprapubic. No involuntary guarding some voluntary guarding on palpation, mild lower abdominal rebound.  No masses  Multiple scars from multiple surgeries  CT reveals some pelvic and perisplenic fluid  R  shunt placed in 1997  1. At this point there is no indication to a surgical / visceral source of pain (no perforation, no organic intraperitoneal or retroperitoneal process readily detectible).  Pelvic fluid is common with  shunt, bu it could conceivably be an infected collection (early abscess)  Patient is chronically constipated.  Based on the above, there is no indication for surgery.  Differential diagnosis includes pelvic early abscess/infection, infected  shunt or an unrelated issue, like an elusive colitis or gynecologic/urologic issue.  Recommend antibiotics/hydration, laxatives and CSF cultures from  shunt. Depending on progress patient may need IR drainage of pelvic collection and colonoscopy  Will follow

## 2023-02-12 NOTE — ED PROVIDER NOTE - PROGRESS NOTE DETAILS
JK - labs significant for lactic acidosis, otherwise WNL. CT showing Small amount of fluid in the pelvis and left upper abdomen with mild loculation and enhancement suggesting peritonitis. Surgery consulted, neurosurgery consulted; recommended admission to medicine for IV abx, IR drainage. Surgery and neurosurgery will continue to follow patient, official recs pending. Currently stable, ready for admission to medicine for further monitoring.

## 2023-02-12 NOTE — ED ADULT TRIAGE NOTE - CHIEF COMPLAINT QUOTE
Pt A&Ox4, NAD. Pt BIBEMS with complaints of abdominal pain x2 day. Pt non verbal at baseline. Breathing even and unlabored. Pt awake, NAD. Pt BIBEMS with complaints of abdominal pain x2 day. Pt non verbal at baseline. Breathing even and unlabored.

## 2023-02-12 NOTE — ED PROVIDER NOTE - INTERNATIONAL TRAVEL
Impression: S/P Cataract Extraction by phacoemulsification with IOL placement; DEXYCU OD - 8 Days. Encounter for surgical aftercare following surgery on a sense organ  Z48.810. Post operative instructions reviewed - Condition is improving - Plan: Return as scheduled, sooner if vision suddenly changes or pain increases. Okay to proceed with second eye. Discussed pt. may see floater/Dexycu. No

## 2023-02-12 NOTE — ED ADULT NURSE NOTE - CHIEF COMPLAINT QUOTE
Pt awake, NAD. Pt BIBEMS with complaints of abdominal pain x2 day. Pt non verbal at baseline. Breathing even and unlabored.

## 2023-02-12 NOTE — CONSULT NOTE ADULT - ASSESSMENT
38 yo F with  shunt complaining of abdominal pain for 2 days, temp 100.9 and normal WBC.  CT abdomen with fluid in the pelvis and LUQ fluid collection, non-working  shunt?    -Recommend admission to medicine  -IV abx  -Nsx will evaluate  shunt  -No emergent surgical intervention required  -Recommend IR evaluation to assess etiology of intraabdominal fluid and assess if infected or sterile.   -Monitor WBC

## 2023-02-12 NOTE — ED ADULT NURSE NOTE - NSICDXPASTMEDICALHX_GEN_ALL_CORE_FT
Pt arrives with c/o chest pain and SOB starting this morning. Denies any other symptoms.    PAST MEDICAL HISTORY:  Dental caries on smooth surface penetrating into dentin     GERD (gastroesophageal reflux disease)     H/O bacterial meningitis @7 days old    History of hydrocephalus     History of seizures     Periodontal disease, unspecified

## 2023-02-12 NOTE — CONSULT NOTE ADULT - ASSESSMENT
ASSESSMENT:   37yF with pmhx of bacterial meningitis at 7 days old, hydrocephalus with hx of  shunt with 15+ revisions-last revision in 1998, seizures, GERD s/p Nissen fundoplication, and dental caries presents to ED with altered mental status.  Neurosurgery consulted d/t abdominal free fluid concerning for infection in the setting of  shunt.       PLAN:    -Reviewed imaging  -Will tap  shunt today and send CSF cultures  -Possible shunt externalization in the AM   -Further medical management/treatment per primary team  -Discussed with Dr. Cohen   ASSESSMENT:   37yF with pmhx of bacterial meningitis at 7 days old, hydrocephalus with hx of  shunt with 15+ revisions-last revision in 1998, seizures, GERD s/p Nissen fundoplication, and dental caries presents to ED with altered mental status.  Neurosurgery consulted d/t abdominal free fluid concerning for infection in the setting of  shunt.       PLAN:    -Reviewed imaging  -Will tap  shunt today and send CSF cultures  -Continue further infectious workup- UA/UC, BC  -Possible shunt externalization in the AM   -Further medical management/treatment per primary team  -Discussed with Dr. Cohen

## 2023-02-12 NOTE — ED ADULT NURSE NOTE - ED STAT RN HANDOFF DETAILS
eport given to on coming nurse Ramón RN [facility]. Understands pmh, medications given and plan of care for patient. Patient in stable condition, vital signs updated, has no complaints at this time and has been updated on care plan. Explained to patient that it is change of shift and new nurse is taking over, pt verbalized understanding.

## 2023-02-12 NOTE — ED ADULT NURSE NOTE - NSIMPLEMENTINTERV_GEN_ALL_ED
Implemented All Fall with Harm Risk Interventions:  Points to call system. Call bell, personal items and telephone within reach. Instruct patient to call for assistance. Room bathroom lighting operational. Non-slip footwear when patient is off stretcher. Physically safe environment: no spills, clutter or unnecessary equipment. Stretcher in lowest position, wheels locked, appropriate side rails in place. Provide visual cue, wrist band, yellow gown, etc. Monitor gait and stability. Monitor for mental status changes and reorient to person, place, and time. Review medications for side effects contributing to fall risk. Reinforce activity limits and safety measures with patient and family. Provide visual clues: red socks.

## 2023-02-12 NOTE — ED PROVIDER NOTE - OBJECTIVE STATEMENT
Patient is a 38 yo female with PMHx GERD, nissen procedure,  meningitis at 8 yo c/b hydrocephalus and seizures s/p  shunt BIBEMS with abdominal pain, fever, decreased PO intake, lethargy. As per EMS and patient mother at bedside, patient has not had a BM since Friday and has been pointing to her RLQ; patient has also been more tired and lethargic with decreased PO intake and low grade fever of 100.1 at home. Patient received Tylenol last night. Denies dysphagia, dysarthria, diplopia, photophobia, syncope, cough, congestion, SOB, CP, neck pain, back pain, diarrhea, dysuria, hematuria, hematochezia, hematemesis, n/v, recent travel, sick contacts, leg swelling.

## 2023-02-12 NOTE — H&P ADULT - ASSESSMENT
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    PLAN:  Severe Sepsis 2/2 Peritonitis r/o Abscess v CNS Infection v  Shunt Infection  -Admit to Telemetry  -CTAP +Peritonitis/Fluid Collection + Shunt  -Hypotension resolved s/p 30cc/kg IVFB NSS  -Continue IVF LR 125cc/hr  -Vanco and Zosyn in ED  -Change Empiric IV ABX  -Cefepime 2g IV q8 + Vanco 1g q12 for CNS Coverage  -Maintain Slow Vanco Infusion over 2 hours to prevent reoccurrence of EMILY  -Vanco level prior 4th Dose  -Cefepime 2g q8 and Flagyl 500 q8 for Peritoneal Coverage  -Ofirmev x1 for Fever  -BCX x2 Pending  -UA/UCX Pending  -CSF/Shunt Cultures/Serologies pending  -No acute abd surgical intervention at this time  -Plan for IR Eval in AM  -Hold VTE PPX in case of intervention  -NPO except meds  -Possible VPS externalization per EPS  -Surgery Consulted appreciate reccs  -Neurosurgery Consulted appreciate reccs  -ID Consulted AM    Hydrocephalus, Epilepsy  -CTH reviewed  -Valium 2mg BID  -Tegretol 600mg BID  -Zonisamide 25mg BID  -Acetazolamide 250mg BID ASSESSMENT:  37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    PLAN:  Severe Sepsis 2/2 Peritonitis r/o Abscess v CNS Infection v  Shunt Infection  -Admit to Telemetry  -CTAP +Peritonitis/Fluid Collection + Shunt  -Hypotension resolved s/p 30cc/kg IVFB NSS  -Continue IVF LR 125cc/hr  -Vanco and Zosyn in ED  -Change Empiric IV ABX  -Cefepime 2g IV q8 + Vanco 1g q12 for CNS Coverage  -Maintain Slow Vanco Infusion over 2 hours to prevent reoccurrence of EMILY  -Vanco level prior 4th Dose  -Cefepime 2g q8 and Flagyl 500 q8 for Peritoneal Coverage  -Ofirmev x1 for Fever  -BCX x2 Pending  -UA/UCX Pending  -CSF/Shunt Cultures/Serologies pending  -No acute abd surgical intervention at this time  -Plan for IR Eval in AM  -Hold VTE PPX in case of intervention  -NPO except meds  -Possible VPS externalization per NSX  -Surgery Consulted appreciate reccs  -Neurosurgery Consulted appreciate reccs  -ID Consulted AM    Hydrocephalus, Epilepsy  -CTH reviewed  -Valium 2mg BID  -Tegretol 600mg BID  -Zonisamide 25mg BID  -Acetazolamide 250mg BID

## 2023-02-12 NOTE — H&P ADULT - HISTORY OF PRESENT ILLNESS
37F with PMHX Citrobacter Meningitis c/b Hydrocephalus (Age 7d) s/p  Shunt s/p Revision x15+, Epilepsy, Nonverbal Baseline, GERD s/p Nissen Fundoplication presents to [facility] ER with Mother (primary caregiver) for evaluation of fever and abdominal pain with listless and lethargy which began 2 days ago. Lactic Acidosis on labs. Febrile in ED Tmax 100.9F. Tachycardic on arrival. Given empiric IV ABX with Vanco and Zosyn for sepsis. CT AP imaging with Peritonitis and fluid collection in setting of  shunt. CTH and Shunt Series reviewed. Surgery and Neurosurgery Consulted.  shunt tapped and cultures pending. Patient seen and examined. Hypotensive on admission with SBP 70s for which she was given additional 30cc/kg IVFB NSS with resolution of hypotension. Patient developed red man syndrome after 900mg of 1g Vanco dose which also resolved. Med list and history confirmed with mother at bedside.     ROS limited 2/2 nonverbal baseline and severity of illness.

## 2023-02-12 NOTE — ED ADULT NURSE REASSESSMENT NOTE - NS ED NURSE REASSESS COMMENT FT1
Upon assessment pt noted to be red on face and trunk. IV Vancomycin stopped on pump. Vital signs taken (see flowsheet) Pt hypotensive 81/43 & . Rapid Response called at approx 21:25. MD at bedside. NS fluid bolus given as ordered. Respirations equal/unlabored. Lungs clear upon auscultation. Mother of pt denies any allergies. Safety maintained.

## 2023-02-12 NOTE — ED PROVIDER NOTE - PHYSICAL EXAMINATION
Constitutional: Tired appearing, nonverbal at baseline, in mild distress   ENMT: Airway patent nasal mucosa clear. Mouth with dry oral mucosa. Throat has no vesicles no oropharyngeal exudates and uvula is midline. No blood in the oropharynx  EYES: clear bilaterally, pupils equal, round and reactive to light  Cardiac: Tachycardic, regular rhythm. Heart sounds S1, S2. No murmurs, rubs or gallops. Good capillary refill, 2+ pulses, no peripheral edema  Respiratory: Lungs CTAB, no use of accessory muscles, no crackles, satting 99% on RA in no distress  Gastrointestinal: Abdomen nondistended, ttp in the RLQ, no rebound guarding or peritoneal signs  Genitourinary: No CVA tenderness, pelvis nontender, bladder nondistended  Musculoskeletal: Spine appears normal, range of motion is not limited, no muscle or joint tenderness  Neurological: Nonverbal at baseline, moving extremities equally, tired appearing, no focal deficits, following commands

## 2023-02-12 NOTE — ED PROVIDER NOTE - CLINICAL SUMMARY MEDICAL DECISION MAKING FREE TEXT BOX
Patient is a 38 yo female with PMHx GERD, nissen procedure,  meningitis at 6 yo c/b hydrocephalus and seizures s/p  shunt BIBEMS with abdominal pain, fever, decreased PO intake, lethargy. As per EMS and patient mother at bedside, patient has not had a BM since Friday and has been pointing to her RLQ; patient has also been more tired and lethargic with decreased PO intake and low grade fever of 100.1 at home. Tachycardic, vss otherwise, tired appearing, dry oropharynx, lungs ctab, belly soft ttp in the RLQ.     Will obtain sepsis labs evaluate for infection with UA viral swab CXR, CT head and shunt study to evaluate for shunt malfunction, CT A/P to evaluate for obstruction vs. appendicitis vs. diverticulitis, hydrate, reassess.

## 2023-02-12 NOTE — ED ADULT NURSE NOTE - OBJECTIVE STATEMENT
Received 36yo male into ED alert & awake. Pt nonverbal at baseline. Mom at bedside complaints of pt having abdominal pain x2 day. also c/o constipation. Breathing even and unlabored. No s/s acute distress noted at this time. Will cont' to monitor.

## 2023-02-12 NOTE — H&P ADULT - NSHPPHYSICALEXAM_GEN_ALL_CORE
Vital Signs Last 24 Hrs  T(C): 37.4 (12 Feb 2023 22:04), Max: 38.3 (12 Feb 2023 14:00)  T(F): 99.4 (12 Feb 2023 22:04), Max: 100.9 (12 Feb 2023 14:00)  HR: 113 (12 Feb 2023 22:04) (100 - 116)  BP: 144/77 (12 Feb 2023 22:04) (81/43 - 144/77)  BP(mean): --  RR: 18 (12 Feb 2023 22:04) (17 - 18)  SpO2: 96% (12 Feb 2023 22:04) (95% - 99%)    Parameters below as of 12 Feb 2023 22:04  Patient On (Oxygen Delivery Method): room air    Constitutional: NAD, VSS  Head: NC/AT  Eyes: PERRL, EOMI, anicteric sclera, conjunctiva WNL  ENT: Normal Pharynx, MMM, No tonsillar exudate/erythema  Neck: Supple, Non-tender  Chest: Non-tender, no rashes  Cardio: +Tachycardia  s1/s2, no appreciable murmurs/rubs/gallops  Resp: BS CTA bilaterally, no wheezing/rhonchi/rales  Abd: Soft, Non-distended, +mild TTP, no rebound/guarding/rigidity  : not examined  Rectal: not examined  MSK: moving all extremities, no motor weakness, full ROM x4  Ext: palpable distal pulses, good capillary refill   Psych: awake, alert, smiling  Neuro: Nonverbal baseline, Awake, Alert, +extremity contractures/flexion  Skin: Warm/Dry. +red man (woman) flush

## 2023-02-12 NOTE — PROGRESS NOTE ADULT - SUBJECTIVE AND OBJECTIVE BOX
Neurosurgery Procedure Note     After consent was obtained from the mother and time out performed   The area of the right sided valve was prepped in sterile fashion  A 23 gauge butterfly needle was placed and clear csf under low pressure was obtained.  CSF orders placed and CSF was left with ED nurses  No complications   Specimen 5cc CSF  No blood loss

## 2023-02-12 NOTE — ED PROVIDER NOTE - ATTENDING CONTRIBUTION TO CARE
38 yo female with PMHx GERD, hydrocephalus and seizures s/p  shunt BIBEMS with abdominal pain, fever, decreased PO intake, lethargy.  + fever 100.1; pt also with rlq pain; pt nonverbal pe awake heent mucosa dry neck supple cor s1s2 lung clear abd mild distention; tender to palp lower abd;  dx abd pain;   eval obstrution, constipation; appy;  labs, ct scan , surgery consult;

## 2023-02-13 LAB
ALBUMIN SERPL ELPH-MCNC: 3 G/DL — LOW (ref 3.3–5.2)
ALP SERPL-CCNC: 58 U/L — SIGNIFICANT CHANGE UP (ref 40–120)
ALT FLD-CCNC: 8 U/L — SIGNIFICANT CHANGE UP
ANION GAP SERPL CALC-SCNC: 12 MMOL/L — SIGNIFICANT CHANGE UP (ref 5–17)
AST SERPL-CCNC: 9 U/L — SIGNIFICANT CHANGE UP
BASOPHILS # BLD AUTO: 0.01 K/UL — SIGNIFICANT CHANGE UP (ref 0–0.2)
BASOPHILS NFR BLD AUTO: 0.2 % — SIGNIFICANT CHANGE UP (ref 0–2)
BILIRUB SERPL-MCNC: 0.4 MG/DL — SIGNIFICANT CHANGE UP (ref 0.4–2)
BUN SERPL-MCNC: 9.5 MG/DL — SIGNIFICANT CHANGE UP (ref 8–20)
CALCIUM SERPL-MCNC: 7.5 MG/DL — LOW (ref 8.4–10.5)
CHLORIDE SERPL-SCNC: 112 MMOL/L — HIGH (ref 96–108)
CO2 SERPL-SCNC: 16 MMOL/L — LOW (ref 22–29)
CREAT SERPL-MCNC: 0.42 MG/DL — LOW (ref 0.5–1.3)
EGFR: 129 ML/MIN/1.73M2 — SIGNIFICANT CHANGE UP
EOSINOPHIL # BLD AUTO: 0 K/UL — SIGNIFICANT CHANGE UP (ref 0–0.5)
EOSINOPHIL NFR BLD AUTO: 0 % — SIGNIFICANT CHANGE UP (ref 0–6)
GLUCOSE SERPL-MCNC: 94 MG/DL — SIGNIFICANT CHANGE UP (ref 70–99)
GRAM STN FLD: SIGNIFICANT CHANGE UP
HCT VFR BLD CALC: 35.4 % — SIGNIFICANT CHANGE UP (ref 34.5–45)
HGB BLD-MCNC: 11.6 G/DL — SIGNIFICANT CHANGE UP (ref 11.5–15.5)
IMM GRANULOCYTES NFR BLD AUTO: 0.6 % — SIGNIFICANT CHANGE UP (ref 0–0.9)
LYMPHOCYTES # BLD AUTO: 0.93 K/UL — LOW (ref 1–3.3)
LYMPHOCYTES # BLD AUTO: 14.5 % — SIGNIFICANT CHANGE UP (ref 13–44)
MAGNESIUM SERPL-MCNC: 1.6 MG/DL — SIGNIFICANT CHANGE UP (ref 1.6–2.6)
MCHC RBC-ENTMCNC: 31.2 PG — SIGNIFICANT CHANGE UP (ref 27–34)
MCHC RBC-ENTMCNC: 32.8 GM/DL — SIGNIFICANT CHANGE UP (ref 32–36)
MCV RBC AUTO: 95.2 FL — SIGNIFICANT CHANGE UP (ref 80–100)
MONOCYTES # BLD AUTO: 0.63 K/UL — SIGNIFICANT CHANGE UP (ref 0–0.9)
MONOCYTES NFR BLD AUTO: 9.8 % — SIGNIFICANT CHANGE UP (ref 2–14)
NEUTROPHILS # BLD AUTO: 4.79 K/UL — SIGNIFICANT CHANGE UP (ref 1.8–7.4)
NEUTROPHILS NFR BLD AUTO: 74.9 % — SIGNIFICANT CHANGE UP (ref 43–77)
NIGHT BLUE STAIN TISS: SIGNIFICANT CHANGE UP
PHOSPHATE SERPL-MCNC: 2.5 MG/DL — SIGNIFICANT CHANGE UP (ref 2.4–4.7)
PLATELET # BLD AUTO: 126 K/UL — LOW (ref 150–400)
POTASSIUM SERPL-MCNC: 3.1 MMOL/L — LOW (ref 3.5–5.3)
POTASSIUM SERPL-SCNC: 3.1 MMOL/L — LOW (ref 3.5–5.3)
PROT SERPL-MCNC: 5.7 G/DL — LOW (ref 6.6–8.7)
RBC # BLD: 3.72 M/UL — LOW (ref 3.8–5.2)
RBC # FLD: 13 % — SIGNIFICANT CHANGE UP (ref 10.3–14.5)
SODIUM SERPL-SCNC: 140 MMOL/L — SIGNIFICANT CHANGE UP (ref 135–145)
SPECIMEN SOURCE: SIGNIFICANT CHANGE UP
SPECIMEN SOURCE: SIGNIFICANT CHANGE UP
WBC # BLD: 6.4 K/UL — SIGNIFICANT CHANGE UP (ref 3.8–10.5)
WBC # FLD AUTO: 6.4 K/UL — SIGNIFICANT CHANGE UP (ref 3.8–10.5)

## 2023-02-13 PROCEDURE — 99223 1ST HOSP IP/OBS HIGH 75: CPT

## 2023-02-13 PROCEDURE — 99232 SBSQ HOSP IP/OBS MODERATE 35: CPT

## 2023-02-13 PROCEDURE — 99233 SBSQ HOSP IP/OBS HIGH 50: CPT

## 2023-02-13 RX ADMIN — Medication 2 MILLIGRAM(S): at 06:26

## 2023-02-13 RX ADMIN — ACETAZOLAMIDE 250 MILLIGRAM(S): 250 TABLET ORAL at 06:27

## 2023-02-13 RX ADMIN — Medication 25 MILLIGRAM(S): at 06:26

## 2023-02-13 RX ADMIN — CEFEPIME 2000 MILLIGRAM(S): 1 INJECTION, POWDER, FOR SOLUTION INTRAMUSCULAR; INTRAVENOUS at 21:43

## 2023-02-13 RX ADMIN — Medication 300 MILLIGRAM(S): at 00:19

## 2023-02-13 RX ADMIN — Medication 100 MILLIGRAM(S): at 21:43

## 2023-02-13 RX ADMIN — ACETAZOLAMIDE 250 MILLIGRAM(S): 250 TABLET ORAL at 18:23

## 2023-02-13 RX ADMIN — Medication 600 MILLIGRAM(S): at 18:22

## 2023-02-13 RX ADMIN — Medication 125 MILLIGRAM(S): at 21:45

## 2023-02-13 RX ADMIN — Medication 100 MILLIGRAM(S): at 13:20

## 2023-02-13 RX ADMIN — CEFEPIME 2000 MILLIGRAM(S): 1 INJECTION, POWDER, FOR SOLUTION INTRAMUSCULAR; INTRAVENOUS at 06:26

## 2023-02-13 RX ADMIN — CEFEPIME 2000 MILLIGRAM(S): 1 INJECTION, POWDER, FOR SOLUTION INTRAMUSCULAR; INTRAVENOUS at 13:20

## 2023-02-13 RX ADMIN — Medication 125 MILLIGRAM(S): at 08:35

## 2023-02-13 RX ADMIN — Medication 100 MILLIGRAM(S): at 06:26

## 2023-02-13 RX ADMIN — Medication 25 MILLIGRAM(S): at 18:23

## 2023-02-13 RX ADMIN — Medication 750 MILLIGRAM(S): at 01:35

## 2023-02-13 RX ADMIN — Medication 2 MILLIGRAM(S): at 18:23

## 2023-02-13 RX ADMIN — Medication 100 MILLIGRAM(S): at 00:19

## 2023-02-13 RX ADMIN — SODIUM CHLORIDE 125 MILLILITER(S): 9 INJECTION, SOLUTION INTRAVENOUS at 08:35

## 2023-02-13 RX ADMIN — Medication 600 MILLIGRAM(S): at 06:29

## 2023-02-13 NOTE — PROGRESS NOTE ADULT - SUBJECTIVE AND OBJECTIVE BOX
HPI:  37F with PMHX Citrobacter Meningitis c/b Hydrocephalus (Age 7d) s/p  Shunt s/p Revision x15+, Epilepsy, Nonverbal Baseline, GERD s/p Nissen Fundoplication presents to [facility] ER with Mother (primary caregiver) for evaluation of fever and abdominal pain with listless and lethargy which began 2 days ago. Lactic Acidosis on labs. Febrile in ED Tmax 100.9F. Tachycardic on arrival. Given empiric IV ABX with Vanco and Zosyn for sepsis. CT AP imaging with Peritonitis and fluid collection in setting of  shunt. CTH and Shunt Series reviewed. Surgery and Neurosurgery Consulted.  shunt tapped and cultures pending. Patient seen and examined. Hypotensive on admission with SBP 70s for which she was given additional 30cc/kg IVFB NSS with resolution of hypotension. Patient developed red man syndrome after 900mg of 1g Vanco dose which also resolved. Med list and history confirmed with mother at bedside.   ROS limited 2/2 nonverbal baseline and severity of illness. (2023 23:11)      INTERVAL HPI/OVERNIGHT EVENTS:  Pt seen and evaluated while in bed in ED, mother at bedside. Pts mother states that pt is more awake and alert today that she was yesterday.       Vital Signs Last 24 Hrs  T(C): 36.8 (2023 11:15), Max: 38.3 (2023 14:00)  T(F): 98.2 (2023 11:15), Max: 100.9 (2023 14:00)  HR: 95 (2023 11:15) (95 - 113)  BP: 96/65 (2023 11:15) (81/43 - 144/77)  BP(mean): --  RR: 19 (2023 11:15) (17 - 19)  SpO2: 99% (2023 11:15) (95% - 99%)    Parameters below as of 2023 11:15  Patient On (Oxygen Delivery Method): room air          PHYSICAL EXAM:  GENERAL: NAD, well-groomed  HEAD:  Atraumatic, normocephalic, shunt compressible   MENTAL STATUS:  Awake; Opens eyes spontaneously; Non verbal at baseline, does not FC at baseline   CRANIAL NERVES: VLADIMIR; EOMI; no facial asymmetry  MOTOR: HURTADO spontaneously, WD B/L LE, antigravity B/L UE  EXTREMITIES:  no clubbing, cyanosis  SKIN: Warm, dry      LABS:                        11.6   6.40  )-----------( 126      ( 2023 06:10 )             35.4     02-13    140  |  112<H>  |  9.5  ----------------------------<  94  3.1<L>   |  16.0<L>  |  0.42<L>    Ca    7.5<L>      2023 06:10  Phos  2.5       Mg     1.6         TPro  5.7<L>  /  Alb  3.0<L>  /  TBili  0.4  /  DBili  x   /  AST  9   /  ALT  8   /  AlkPhos  58  02-13    PT/INR - ( 2023 12:00 )   PT: 16.4 sec;   INR: 1.41 ratio         PTT - ( 2023 12:00 )  PTT:26.1 sec  Urinalysis Basic - ( 2023 20:41 )    Color: Yellow / Appearance: Slightly Turbid / S.010 / pH: x  Gluc: x / Ketone: Small  / Bili: Negative / Urobili: 4   Blood: x / Protein: 30 mg/dL / Nitrite: Positive   Leuk Esterase: Trace / RBC: 0-2 /HPF / WBC 6-10 /HPF   Sq Epi: x / Non Sq Epi: Moderate / Bacteria: Many        RADIOLOGY & ADDITIONAL TESTS:    ACC: [id number] EXAM:  CT BRAIN  PROCEDURE DATE:  2023    IMPRESSION:  Changes from anterior bifrontal ventriculostomy catheters in place.  Anterior bifrontal encephalomalacia and gliosis.  Findings suggestive of callosal dysgenesis. Correlate with patient's   history.  No acute intracranial bleeding.

## 2023-02-13 NOTE — PROGRESS NOTE ADULT - SUBJECTIVE AND OBJECTIVE BOX
INTERVAL HPI/OVERNIGHT EVENTS:    Patient evaluated at bedside. No acute distress. Patient was a RR overnight after recieving Vancomycin and developing facial redness and hypotension, now improved. NSx obtained CFS for work-up.    MEDICATIONS  (STANDING):  acetaZOLAMIDE    Tablet 250 milliGRAM(s) Oral two times a day  carBAMazepine ER Capsule 600 milliGRAM(s) Oral two times a day  cefepime  Injectable. 2000 milliGRAM(s) IV Push every 8 hours  diazepam    Tablet 2 milliGRAM(s) Oral two times a day  lactated ringers. 1000 milliLiter(s) (125 mL/Hr) IV Continuous <Continuous>  metroNIDAZOLE  IVPB      metroNIDAZOLE  IVPB 500 milliGRAM(s) IV Intermittent every 8 hours  vancomycin  IVPB 1000 milliGRAM(s) IV Intermittent every 12 hours  zonisamide 25 milliGRAM(s) Oral two times a day    MEDICATIONS  (PRN):  acetaminophen     Tablet .. 650 milliGRAM(s) Oral every 6 hours PRN Temp greater or equal to 38C (100.4F), Mild Pain (1 - 3)  aluminum hydroxide/magnesium hydroxide/simethicone Suspension 30 milliLiter(s) Oral every 4 hours PRN Dyspepsia  melatonin 3 milliGRAM(s) Oral at bedtime PRN Insomnia  ondansetron Injectable 4 milliGRAM(s) IV Push every 8 hours PRN Nausea and/or Vomiting      Vital Signs Last 24 Hrs  T(C): 36.8 (2023 23:51), Max: 38.3 (2023 14:00)  T(F): 98.2 (2023 23:51), Max: 100.9 (2023 14:00)  HR: 109 (2023 23:51) (100 - 116)  BP: 113/78 (2023 23:51) (81/43 - 144/77)  BP(mean): --  RR: 18 (2023 23:51) (17 - 18)  SpO2: 95% (2023 23:51) (95% - 99%)    Parameters below as of 2023 23:51  Patient On (Oxygen Delivery Method): room air        PHYSICAL EXAM:  GENERAL: NAD, non-verbal.  HEAD:  Atraumatic, R side  shunt.   EYES: EOMI, PERRLA  NECK: Supple, No JVD  CHEST/LUNG: non-labored breathing on room air.   HEART: Regular rate and rhythm; S1/S2  ABDOMEN: Soft, diffusely tender, voluntary guarding. Midline laparotomy scar and L side PEG tube scar well healed.   VASCULAR: Normal pulses, Normal capillary refill  EXTREMITIES:  2+ Peripheral Pulses, No cyanosis, No edema, contracted.       I&O's Detail      LABS:                        13.9   9.81  )-----------( 185      ( 2023 12:00 )             42.5     02-12    141  |  109<H>  |  17.6  ----------------------------<  122<H>  4.2   |  19.0<L>  |  0.56    Ca    8.9      2023 12:00    TPro  7.5  /  Alb  3.8  /  TBili  0.3<L>  /  DBili  x   /  AST  17  /  ALT  13  /  AlkPhos  73  02-12    PT/INR - ( 2023 12:00 )   PT: 16.4 sec;   INR: 1.41 ratio         PTT - ( 2023 12:00 )  PTT:26.1 sec  Urinalysis Basic - ( 2023 20:41 )    Color: Yellow / Appearance: Slightly Turbid / S.010 / pH: x  Gluc: x / Ketone: Small  / Bili: Negative / Urobili: 4   Blood: x / Protein: 30 mg/dL / Nitrite: Positive   Leuk Esterase: Trace / RBC: 0-2 /HPF / WBC 6-10 /HPF   Sq Epi: x / Non Sq Epi: Moderate / Bacteria: Many        RADIOLOGY & ADDITIONAL STUDIES:

## 2023-02-13 NOTE — PROGRESS NOTE ADULT - SUBJECTIVE AND OBJECTIVE BOX
Patient is a 37y old  Female who presents with a chief complaint of Severe Sepsis (2023 02:19)    Patient seen and examined at bedside.      ALLERGIES:  Bee Sting (Swelling)  No Known Drug Allergies    MEDICATIONS  (STANDING):  acetaZOLAMIDE    Tablet 250 milliGRAM(s) Oral two times a day  carBAMazepine ER Capsule 600 milliGRAM(s) Oral two times a day  cefepime  Injectable. 2000 milliGRAM(s) IV Push every 8 hours  diazepam    Tablet 2 milliGRAM(s) Oral two times a day  lactated ringers. 1000 milliLiter(s) (125 mL/Hr) IV Continuous <Continuous>  metroNIDAZOLE  IVPB      metroNIDAZOLE  IVPB 500 milliGRAM(s) IV Intermittent every 8 hours  vancomycin  IVPB 1000 milliGRAM(s) IV Intermittent every 12 hours  zonisamide 25 milliGRAM(s) Oral two times a day    MEDICATIONS  (PRN):  acetaminophen     Tablet .. 650 milliGRAM(s) Oral every 6 hours PRN Temp greater or equal to 38C (100.4F), Mild Pain (1 - 3)  aluminum hydroxide/magnesium hydroxide/simethicone Suspension 30 milliLiter(s) Oral every 4 hours PRN Dyspepsia  melatonin 3 milliGRAM(s) Oral at bedtime PRN Insomnia  ondansetron Injectable 4 milliGRAM(s) IV Push every 8 hours PRN Nausea and/or Vomiting    Vital Signs Last 24 Hrs  T(F): 98.1 (2023 07:24), Max: 100.9 (2023 14:00)  HR: 102 (2023 07:24) (95 - 113)  BP: 104/71 (2023 07:24) (81/43 - 144/77)  RR: 19 (2023 07:24) (17 - 19)  SpO2: 97% (2023 07:24) (95% - 99%)  I&O's Summary    PHYSICAL EXAM:  General: NAD, laying in bed  ENT: MMM, no thrush  Neck: Supple, No JVD  Lungs: Clear to auscultation bilaterally, good air entry, non-labored breathing  Cardio: +s1/s2  Abdomen: Soft, Nontender, Nondistended; Bowel sounds present  Extremities: No calf tenderness, No pitting edema    LABS:                        11.6   6.40  )-----------( 126      ( 2023 06:10 )             35.4     02-13    140  |  112  |  9.5  ----------------------------<  94  3.1   |  16.0  |  0.42    Ca    7.5      2023 06:10  Phos  2.5     02-13  Mg     1.6     02-13    TPro  5.7  /  Alb  3.0  /  TBili  0.4  /  DBili  x   /  AST  9   /  ALT  8   /  AlkPhos  58  02-13          PT/INR - ( 2023 12:00 )   PT: 16.4 sec;   INR: 1.41 ratio         PTT - ( 2023 12:00 )  PTT:26.1 sec  Lactate, Blood: 1.3 mmol/L ( @ 18:20)              12:00 - VBG - pH: 7.330 | pCO2: 39    | pO2: 114   | Lactate: 2.20     Urinalysis Basic - ( 2023 20:41 )  Color: Yellow / Appearance: Slightly Turbid / S.010 / pH: x  Gluc: x / Ketone: Small  / Bili: Negative / Urobili: 4   Blood: x / Protein: 30 mg/dL / Nitrite: Positive   Leuk Esterase: Trace / RBC: 0-2 /HPF / WBC 6-10 /HPF   Sq Epi: x / Non Sq Epi: Moderate / Bacteria: Many    Cultures  Culture - CSF with Gram Stain (collected 2023 20:40)  Source: .CSF CSF  Gram Stain (2023 04:03):    polymorphonuclear leukocytes seen    No organisms seen    by cytocentrifuge    Culture - Acid Fast - CSF (collected 2023 20:40)  Source: .CSF CSF        RADIOLOGY & ADDITIONAL TESTS:    Care Discussed with Consultants/Other Providers:

## 2023-02-13 NOTE — CONSULT NOTE ADULT - ASSESSMENT
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     CT AP showed small amount of fluid in the pelvis and left upper abdomen with mild loculation and enhancement suggestive of  peritonitis. Started on IV metronidazole + cefepime + vancomycin.       - CT AP results noted  - CSF cell count not impressive, but these results could be seen with indolent organisms   - No hypoglycorrhachia; normal protein. CSF LDH not obtained    - CSF gram neg; culture pending  - Will check with lab if able to add CSF PCR panel  - IR consulted for possible percutaneous drainage of loculated collection   - Continue empiric vanco + cefepime + metronidazole at current doses   - Slow vanco infusion given episode of EMILY  - Monitor vanco through; dose adjustment per pharmacy protocol. Monitor for vanco toxicity .   - Check full RVP   - no leukocytosis on admission; monitor WBC   - Monitor fever curve    D/w Dr. Solano   D/w mother Ana    Will follow

## 2023-02-13 NOTE — PATIENT PROFILE ADULT - FALL HARM RISK - HARM RISK INTERVENTIONS

## 2023-02-13 NOTE — PATIENT PROFILE ADULT - NSPROPATIENTLACTATING_GEN_A_NUR
History of Present Illness


18-year-old the pleasant male came in with comments of cough with brown sputum 

production fever chills body aches gentleman's body aches all his symptoms 

started on Monday patient started having cough with brownish sputum production 

which cleared up yesterday patient was given azithromycin as an outpatient.  

Patient denied any fever now patient had high-grade fever last night received 

ceftriaxone chest x-ray was done which is reviewed by me I didn't it's not 

impressive for infiltrate for me but it was read as bronchopneumonia by 

radiologist.  Patient doesn't have any bronchophony or egophony on exam.  I 

believe patient may have severe bronchitis along with was some viral illness.  

Patient will be discharged with Ceftin if patient starts having fever again if 

she symptoms doesn't improve patient was asked to come back.  Patient will give 

a given a dose of Rocephin today before his discharge








Review of Systems








REVIEW OF SYSTEMS: 


CONSTITUTIONAL: No fever, no malaise, no fatigue. 


HEENT: No recent visual problems or hearing problems. Denied any sore throat. 


CARDIOVASCULAR: No chest pain, orthopnea, PND, no palpitations, no syncope. 


PULMONARY: No shortness of breath, 


GASTROINTESTINAL: No diarrhea, no nausea, no vomiting, no abdominal pain. 


NEUROLOGICAL: No headaches, no weakness, no numbness. 


HEMATOLOGICAL: Denies any bleeding or petechiae. 


GENITOURINARY: Denies any burning micturition, frequency, or urgency. 


MUSCULOSKELETAL/RHEUMATOLOGICAL: Denies any joint pain, swelling, or any muscle 

pain. 


ENDOCRINE: Denies any polyuria or polydipsia. 





The rest of the 14-point review of systems is negative.











Past Medical History


Past Medical History: Asthma


Additional Past Medical History / Comment(s): Pt states he uses albuterol 

inhaler very rarely for asthma


History of Any Multi-Drug Resistant Organisms: None Reported


Past Surgical History: No Surgical Hx Reported


Past Anesthesia/Blood Transfusion Reactions: No Reported Reaction


Past Psychological History: No Psychological Hx Reported


Smoking Status: Never smoker


Past Alcohol Use History: None Reported


Past Drug Use History: None Reported





Medications and Allergies


                                Home Medications











 Medication  Instructions  Recorded  Confirmed  Type


 


Azithromycin [Zithromax Tri-Baldo] See Taper PO DAILY 08/05/19 08/05/19 History








                                    Allergies











Allergy/AdvReac Type Severity Reaction Status Date / Time


 


No Known Allergies Allergy   Verified 08/05/19 20:46














Physical Exam


Vitals: 


                                   Vital Signs











  Temp Pulse Pulse Pulse Resp BP BP


 


 08/06/19 12:48  99.6 F   90   18   119/83


 


 08/06/19 11:28  98.9 F   90   16   128/85


 


 08/06/19 06:10  98.4 F    82  16   115/73


 


 08/06/19 01:42  98.3 F    86  18   117/74


 


 08/06/19 00:22  99 F  90    8 L  117/70 


 


 08/05/19 23:03  101.2 F H  95    18  115/69 


 


 08/05/19 20:22  100.7 F H  115 H    20  118/86 














  Pulse Ox


 


 08/06/19 12:48  98


 


 08/06/19 11:28  93 L


 


 08/06/19 06:10  95


 


 08/06/19 01:42 


 


 08/06/19 00:22  100


 


 08/05/19 23:03  98


 


 08/05/19 20:22  97








                                Intake and Output











 08/05/19 08/06/19 08/06/19





 22:59 06:59 14:59


 


Intake Total  100 


 


Balance  100 


 


Intake:   


 


  Oral  100 


 


Other:   


 


  Voiding Method  Toilet 


 


  # Voids  1 


 


  # Bowel Movements  0 


 


  Weight 72.575 kg  




















PHYSICAL EXAMINATION: 





GENERAL: The patient is alert and oriented x3, not in any acute distress. Well 

developed, well nourished. 


HEENT: Pupils are round and equally reacting to light. EOMI. No scleral icterus.

No conjunctival pallor. Normocephalic, atraumatic. No pharyngeal erythema. No 

thyromegaly. 


CARDIOVASCULAR: S1 and S2 present. No murmurs, rubs, or gallops. 


PULMONARY: Chest is clear to auscultation, no wheezing or crackles. 


ABDOMEN: Soft, nontender, nondistended, normoactive bowel sounds. No palpable 

organomegaly. 


MUSCULOSKELETAL: No joint swelling or deformity.


EXTREMITIES: No cyanosis, clubbing, or pedal edema. 


NEUROLOGICAL: Gross neurological examination did not reveal any focal deficits. 


SKIN: No rashes. 














Results


CBC & Chem 7: 


                                 08/05/19 20:47





                                 08/05/19 20:47


Labs: 


                  Abnormal Lab Results - Last 24 Hours (Table)











  08/05/19 08/05/19 08/05/19 Range/Units





  20:47 20:47 20:58 


 


WBC   18.2 H   (4.0-11.0)  k/uL


 


Neutrophils #   16.0 H   (1.3-7.7)  k/uL


 


Lymphocytes #   0.7 L   (1.0-4.8)  k/uL


 


Glucose  115 H    (74-99)  mg/dL


 


Total Bilirubin  1.5 H    (0.2-1.3)  mg/dL


 


AST  12 L    (17-59)  U/L


 


ALT  20 L    (21-72)  U/L


 


Lipase  <10 L    ()  U/L


 


Urine Protein    1+ H  (Negative)  


 


Urine Ketones    1+ H  (Negative)  


 


Urine Mucus    Many H  (None)  /hpf














Thrombosis Risk Factor Assmnt





- Choose All That Apply


Any of the Below Risk Factors Present?: Yes


Each Factor Represents 1 point: Serious lung disease incl. pneumonia (< 1month)


Thrombosis Risk Factor Assessment Total Risk Factor Score: 1


Thrombosis Risk Factor Assessment Level: Low Risk





Assessment and Plan


Plan: 


-Sepsis probably because of viral illness secondary back to bronchitis or 

bronchopneumonia cannot be ruled out patient will be discharged on anti-medics 

as mentioned above.


-Leukocytosis secondary to sepsis
no

## 2023-02-13 NOTE — PROGRESS NOTE ADULT - ASSESSMENT
37yF with pmhx hydrocephalus with hx of  shunt with 15+ revisions-last revision in 1998, seizures, presented to ED with altered mental status, concerns of abdominal free fluid concerning for infection in the setting of  shunt.     -case discussed w/ Dr. Cohen  -images reviewed  -surgery following, reports at this point there is no indication to a surgical / visceral source of pain, indication for surgery, recommend antibiotics/hydration, laxatives and CSF cultures from  shunt. Depending on progress patient may need IR drainage of pelvic collection and colonoscopy  -ID following, recommends continue empiric vanco + cefepime + metronidazole at current doses  -agree w/ abx coverage, will continue to follow

## 2023-02-13 NOTE — PROGRESS NOTE ADULT - ASSESSMENT
38 yo F with  shunt complaining of abdominal pain for 2 days, temp 100.9 and normal WBC.  CT abdomen with fluid in the pelvis and LUQ fluid collection, non-working  shunt, pending evaluation of CSF results.    -IV abx  -Nsx following and obtained CSF for testing  -No emergent surgical intervention required  - Differential diagnosis includes pelvic early abscess/infection, infected  shunt or an unrelated issue, like an elusive colitis or gynecologic/urologic issue. Recommend antibiotics/hydration, laxatives and CSF cultures from  shunt. Depending on progress patient may need IR drainage of pelvic collection and colonoscopy

## 2023-02-13 NOTE — PROVIDER CONTACT NOTE (OTHER) - SITUATION
In nurse handoff report, ED RN stated pt developed red man syndrome after receiving IV dose of vancomycin. As per RN, rate was decreased and pt completed infusion.

## 2023-02-13 NOTE — PROVIDER CONTACT NOTE (OTHER) - ASSESSMENT
Pt arrived to unit 17:45. Pt nonverbal. Mother present at bedside. As per mother, pt developed rash throughout body on 2/12. AM dose given 2/13 over 2 hours and pt tolerated well.

## 2023-02-13 NOTE — PROGRESS NOTE ADULT - ASSESSMENT
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    #Severe Sepsis- present on admission   - 2/2 Peritonitis vs  Shunt Infection vs other etiology  - surgery consult appreciated  - ivf  - ID consult appreciated- abx per ID   - monitor cultures  - neurosurgery consult appreciated   - may need IR drainage and colonoscopy in future given pelvic fluid collections    #Hydrocephalus, Epilepsy  - Valium, Tegretol, xonisamide, acetazolamide    #DVT Prophylaxis  - venodynes    plan of care d/w patient mom bedside.

## 2023-02-13 NOTE — PROVIDER CONTACT NOTE (OTHER) - BACKGROUND
Dr. Solano notified regarding report received from ED RN. No documentation noted regarding incident. As per MD dose to be administered over 2 hours to prevent occurrence from happening again.

## 2023-02-14 LAB
ANION GAP SERPL CALC-SCNC: 13 MMOL/L — SIGNIFICANT CHANGE UP (ref 5–17)
BUN SERPL-MCNC: 5.9 MG/DL — LOW (ref 8–20)
CALCIUM SERPL-MCNC: 8.1 MG/DL — LOW (ref 8.4–10.5)
CHLORIDE SERPL-SCNC: 109 MMOL/L — HIGH (ref 96–108)
CO2 SERPL-SCNC: 18 MMOL/L — LOW (ref 22–29)
CREAT SERPL-MCNC: 0.51 MG/DL — SIGNIFICANT CHANGE UP (ref 0.5–1.3)
EGFR: 123 ML/MIN/1.73M2 — SIGNIFICANT CHANGE UP
GLUCOSE SERPL-MCNC: 113 MG/DL — HIGH (ref 70–99)
HCT VFR BLD CALC: 34.1 % — LOW (ref 34.5–45)
HGB BLD-MCNC: 11.8 G/DL — SIGNIFICANT CHANGE UP (ref 11.5–15.5)
MAGNESIUM SERPL-MCNC: 1.7 MG/DL — SIGNIFICANT CHANGE UP (ref 1.6–2.6)
MCHC RBC-ENTMCNC: 31.6 PG — SIGNIFICANT CHANGE UP (ref 27–34)
MCHC RBC-ENTMCNC: 34.6 GM/DL — SIGNIFICANT CHANGE UP (ref 32–36)
MCV RBC AUTO: 91.2 FL — SIGNIFICANT CHANGE UP (ref 80–100)
PLATELET # BLD AUTO: 169 K/UL — SIGNIFICANT CHANGE UP (ref 150–400)
POTASSIUM SERPL-MCNC: 2.7 MMOL/L — CRITICAL LOW (ref 3.5–5.3)
POTASSIUM SERPL-SCNC: 2.7 MMOL/L — CRITICAL LOW (ref 3.5–5.3)
RBC # BLD: 3.74 M/UL — LOW (ref 3.8–5.2)
RBC # FLD: 12.6 % — SIGNIFICANT CHANGE UP (ref 10.3–14.5)
SODIUM SERPL-SCNC: 140 MMOL/L — SIGNIFICANT CHANGE UP (ref 135–145)
VANCOMYCIN TROUGH SERPL-MCNC: 19.2 UG/ML — SIGNIFICANT CHANGE UP (ref 10–20)
WBC # BLD: 6.39 K/UL — SIGNIFICANT CHANGE UP (ref 3.8–10.5)
WBC # FLD AUTO: 6.39 K/UL — SIGNIFICANT CHANGE UP (ref 3.8–10.5)

## 2023-02-14 PROCEDURE — 99233 SBSQ HOSP IP/OBS HIGH 50: CPT

## 2023-02-14 PROCEDURE — 99232 SBSQ HOSP IP/OBS MODERATE 35: CPT

## 2023-02-14 RX ORDER — SENNA PLUS 8.6 MG/1
2 TABLET ORAL AT BEDTIME
Refills: 0 | Status: DISCONTINUED | OUTPATIENT
Start: 2023-02-14 | End: 2023-02-16

## 2023-02-14 RX ORDER — POLYETHYLENE GLYCOL 3350 17 G/17G
17 POWDER, FOR SOLUTION ORAL DAILY
Refills: 0 | Status: DISCONTINUED | OUTPATIENT
Start: 2023-02-14 | End: 2023-02-16

## 2023-02-14 RX ORDER — MAGNESIUM OXIDE 400 MG ORAL TABLET 241.3 MG
400 TABLET ORAL
Refills: 0 | Status: COMPLETED | OUTPATIENT
Start: 2023-02-14 | End: 2023-02-14

## 2023-02-14 RX ORDER — POTASSIUM CHLORIDE 20 MEQ
40 PACKET (EA) ORAL EVERY 4 HOURS
Refills: 0 | Status: COMPLETED | OUTPATIENT
Start: 2023-02-14 | End: 2023-02-14

## 2023-02-14 RX ADMIN — Medication 10 MILLIGRAM(S): at 17:06

## 2023-02-14 RX ADMIN — Medication 100 MILLIGRAM(S): at 21:21

## 2023-02-14 RX ADMIN — Medication 100 MILLIGRAM(S): at 14:11

## 2023-02-14 RX ADMIN — Medication 2 MILLIGRAM(S): at 05:24

## 2023-02-14 RX ADMIN — Medication 600 MILLIGRAM(S): at 05:25

## 2023-02-14 RX ADMIN — ACETAZOLAMIDE 250 MILLIGRAM(S): 250 TABLET ORAL at 17:41

## 2023-02-14 RX ADMIN — Medication 650 MILLIGRAM(S): at 21:16

## 2023-02-14 RX ADMIN — ACETAZOLAMIDE 250 MILLIGRAM(S): 250 TABLET ORAL at 05:24

## 2023-02-14 RX ADMIN — Medication 40 MILLIEQUIVALENT(S): at 14:11

## 2023-02-14 RX ADMIN — Medication 100 MILLIGRAM(S): at 05:25

## 2023-02-14 RX ADMIN — SENNA PLUS 2 TABLET(S): 8.6 TABLET ORAL at 21:15

## 2023-02-14 RX ADMIN — Medication 650 MILLIGRAM(S): at 12:19

## 2023-02-14 RX ADMIN — MAGNESIUM OXIDE 400 MG ORAL TABLET 400 MILLIGRAM(S): 241.3 TABLET ORAL at 17:43

## 2023-02-14 RX ADMIN — Medication 25 MILLIGRAM(S): at 17:41

## 2023-02-14 RX ADMIN — Medication 40 MILLIEQUIVALENT(S): at 08:18

## 2023-02-14 RX ADMIN — Medication 125 MILLIGRAM(S): at 21:19

## 2023-02-14 RX ADMIN — Medication 40 MILLIEQUIVALENT(S): at 09:49

## 2023-02-14 RX ADMIN — Medication 600 MILLIGRAM(S): at 17:40

## 2023-02-14 RX ADMIN — CEFEPIME 2000 MILLIGRAM(S): 1 INJECTION, POWDER, FOR SOLUTION INTRAMUSCULAR; INTRAVENOUS at 21:19

## 2023-02-14 RX ADMIN — Medication 125 MILLIGRAM(S): at 09:46

## 2023-02-14 RX ADMIN — MAGNESIUM OXIDE 400 MG ORAL TABLET 400 MILLIGRAM(S): 241.3 TABLET ORAL at 08:18

## 2023-02-14 RX ADMIN — Medication 2 MILLIGRAM(S): at 17:41

## 2023-02-14 RX ADMIN — Medication 650 MILLIGRAM(S): at 12:49

## 2023-02-14 RX ADMIN — CEFEPIME 2000 MILLIGRAM(S): 1 INJECTION, POWDER, FOR SOLUTION INTRAMUSCULAR; INTRAVENOUS at 14:11

## 2023-02-14 RX ADMIN — Medication 25 MILLIGRAM(S): at 05:24

## 2023-02-14 RX ADMIN — CEFEPIME 2000 MILLIGRAM(S): 1 INJECTION, POWDER, FOR SOLUTION INTRAMUSCULAR; INTRAVENOUS at 05:25

## 2023-02-14 NOTE — PROGRESS NOTE ADULT - ASSESSMENT
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 CT head on admission, with Changes from anterior bifrontal ventriculostomy catheters in place. Anterior bifrontal encephalomalacia and gliosis. Findings suggestive of callosal dysgenesis. CT abdomen revealed Ventriculoperitoneal shunt. Small amount of fluid in the pelvis and left upper abdomen with mild loculation and enhancement suggesting peritonitis. Infection is a possibility. Cultures were sent. Neurosurgery and general surgery was consulted. Empiric antibiotics were initiated. CSF sent for analysis by neurosurgery. She was given Vancomycin, Cefepime and Metronidazole. IR consulted to assess pelvic collections for drainage, advised to continue current antibiotics and re image if she clinically worsens. Her mental status improved. Blood and CSF cultures were negative.    1. Sepsis sec peritonitis:  Continue antibiotics per ID  neurosurgery and IR recommendations noted  follow up cultures.  no plans for drainage as per IR.      2. Metabolic encephalopathy sec sepsis:  Improving  speech/swallow evaluations.    3. Seizures and hydrocephalus:  Continue Acetazolamide  On Valium, Carbamazepine, Zonisamide.     4. DVT ppx:  Heparin     Discussed with patient's mother and IR.

## 2023-02-14 NOTE — PROGRESS NOTE ADULT - SUBJECTIVE AND OBJECTIVE BOX
[facility]  INFECTIOUS DISEASES at [place] and [place]  ===============================================================                               Rohan Garcia MD*     Mary Wolff MD*                         Sonia Hargrove MD*       Tracie Peterson MD*            Diplomates American Board of Internal Medicine & Infectious Diseases                * [place] Office - Appt - Tel  [phone] Fax [phone]                * [place] Office - Appt - Tel [phone] Fax [phone]                                  Hospital Consult line:  [phone]  ==============================================================    REJI MCGARRY [id number]    Follow up: suspected VPshunt infection     No acute events  Afebrile, hemodynamically stable       I have personally reviewed the labs and data; pertinent labs and data are listed in this note; please see below.     _______________________________________________________________  REVIEW OF SYSTEMS  Unable to obtain due to medical condition - non verbal     Per mom doing better, smiling more but still with abd pain   ________________________________________________________________  Allergies:  Bee Sting (Swelling)  No Known Drug Allergies        ________________________________________________________________  PHYSICAL EXAM  GEN: in NAD, lying in bed.   HEENT: Anicteric sclerae, Moist mucous membranes.   NECK: Supple.   LUNGS: eupneic. CTA B/L.  HEART: RRR, no m/r/g  ABDOMEN: Soft, mild discomfort over RLQ, +BS  : No Munoz catheter  EXTREMITIES: well perfused, without  edema.  MSK: contractures  NEUROLOGIC: awake, non verbal   SKIN: No rash, wounds or jaundice  LINES: PIV  ________________________________________________________________  Vitals:  T(F): 99.1 (14 Feb 2023 10:52), Max: 99.1 (14 Feb 2023 10:52)  HR: 96 (14 Feb 2023 10:52)  BP: 114/75 (14 Feb 2023 10:52)  RR: 19 (14 Feb 2023 10:52)  SpO2: 98% (14 Feb 2023 10:52) (96% - 99%)  temp max in last 48H T(F): , Max: 100.9 (02-12-23 @ 14:00)    Current Antibiotics:  cefepime  Injectable. 2000 milliGRAM(s) IV Push every 8 hours  metroNIDAZOLE  IVPB      metroNIDAZOLE  IVPB 500 milliGRAM(s) IV Intermittent every 8 hours  vancomycin  IVPB 1000 milliGRAM(s) IV Intermittent every 12 hours    Other medications:  acetaZOLAMIDE    Tablet 250 milliGRAM(s) Oral two times a day  carBAMazepine ER Capsule 600 milliGRAM(s) Oral two times a day  diazepam    Tablet 2 milliGRAM(s) Oral two times a day  magnesium oxide 400 milliGRAM(s) Oral two times a day with meals  potassium chloride    Tablet ER 40 milliEquivalent(s) Oral every 4 hours  zonisamide 25 milliGRAM(s) Oral two times a day                            11.8   6.39  )-----------( 169      ( 14 Feb 2023 06:17 )             34.1     02-14    140  |  109<H>  |  5.9<L>  ----------------------------<  113<H>  2.7<LL>   |  18.0<L>  |  0.51    Ca    8.1<L>      14 Feb 2023 06:17  Phos  2.5     02-13  Mg     1.7     02-14    TPro  5.7<L>  /  Alb  3.0<L>  /  TBili  0.4  /  DBili  x   /  AST  9   /  ALT  8   /  AlkPhos  58  02-13    RECENT CULTURES:  02-12 @ 20:41 Clean Catch Clean Catch (Midstream)     50,000 - 99,000 CFU/mL Escherichia coli        02-12 @ 20:40 .CSF CSF     No growth    polymorphonuclear leukocytes seen  No organisms seen  by cytocentrifuge      02-12 @ 11:00 .Blood Blood-Peripheral     No growth to date.        WBC Count: 6.39 K/uL (02-14-23 @ 06:17)  WBC Count: 6.40 K/uL (02-13-23 @ 06:10)  WBC Count: 9.81 K/uL (02-12-23 @ 12:00)    Creatinine, Serum: 0.51 mg/dL (02-14-23 @ 06:17)  Creatinine, Serum: 0.42 mg/dL (02-13-23 @ 06:10)  Creatinine, Serum: 0.56 mg/dL (02-12-23 @ 12:00)    [facility] (02-12-23 @ 12:00)    ________________________________________________________________  RADIOLOGY  < from: Xray Shunt Series (02.12.23 @ 17:06) >  INTERPRETATION:  HISTORY:  Shunt series    TECHNIQUE:  AP and lateral views of the skull, chest, abdomen and pelvis   were obtained.    FINDINGS:  There is a right-sided ventricular shunt noted which goes down   the right side of the neck crosses down the left side of the thorax and   enters the upper abdomen. It coils down to the pelvis with its tip in the   midabdomen. There is no evidence of discontinuation or kinking. Limited   evaluation of the paranasal sinuses are grossly clear. The lungs are   grossly clear. There is air distended loops of small and large bowel   noted. Contrast is seen in the kidneys, collecting system and bladder   from a recent CT.    IMPRESSION:   shunt as above.    < end of copied text >    < from: CT Abdomen and Pelvis w/ Oral Cont and w/ IV Cont (02.12.23 @ 15:52) >  FINDINGS:  LOWER CHEST: Within normal limits.    LIVER: Within normal limits.  BILE DUCTS: Normal caliber.  GALLBLADDER: Within normal limits.  SPLEEN: Within normal limits.  PANCREAS: Within normal limits.  ADRENALS: Within normal limits.  KIDNEYS/URETERS: Within normal limits.    BLADDER: Within normal limits.  REPRODUCTIVE ORGANS: Uterus and bilateral adnexa within normal limits.    BOWEL: Dilatation of bowel loops, without evidence of obstruction.   Appendix not visualized.  PERITONEUM:  shunt enters the epigastricregion and extends into the   right pelvis before extending laterally and cephalad to the level of the   left midabdomen.    Small amount of free fluid in the pelvis with small loculated pockets   lateral to the splenic flexure. Mild peripheral enhancement associated   with the peritoneal fluid  VESSELS: Within normal limits.  RETROPERITONEUM/LYMPH NODES: No lymphadenopathy.  ABDOMINAL WALL: Within normal limits.  BONES: Within normal limits.    IMPRESSION:  Ventriculoperitoneal shunt, as described.    Small amount of fluid in the pelvis and left upper abdomen with mild   loculation and enhancement suggesting peritonitis. Infection is a   possibility.    < end of copied text >

## 2023-02-14 NOTE — CONSULT NOTE ADULT - ASSESSMENT
37 year-old female admitted for severe sepsis. Seen in consultation for fluid collection drainage. CT AP reviewed, two small collections noted in the lower left abdomen. Given the size of these collections, recommend deferring drainage at this time and continuing conservative management with IV antibiotics. If patient's symptoms being to worsen clinically recommend repeating CT imaging.     Please call extension [phone] with any questions, concerns or issues regarding above.

## 2023-02-14 NOTE — CONSULT NOTE ADULT - CONSULT REASON
shunt
Intraabdominal fluid
evaluation of possible fluid collection drainage
Suspected  shunt infection

## 2023-02-14 NOTE — PROGRESS NOTE ADULT - SUBJECTIVE AND OBJECTIVE BOX
INTERVAL HPI/OVERNIGHT EVENTS:    CC: sepsis sec peritonitis vs shunt infection, hydrocephalus s/p  shunt, seizures      Chart and course reviewed  no overnight events  mother at bedside  states patient more alert today although not close to baseline  no BM since Friday, usually has BM post suppository at home twice a week.    Vital Signs Last 24 Hrs  T(C): 37.3 (2023 10:52), Max: 37.3 (2023 10:52)  T(F): 99.1 (2023 10:52), Max: 99.1 (2023 10:52)  HR: 96 (2023 10:52) (96 - 107)  BP: 114/75 (2023 10:52) (114/75 - 119/79)  BP(mean): --  RR: 19 (2023 10:52) (18 - 19)  SpO2: 98% (2023 10:52) (96% - 98%)    Parameters below as of 2023 04:32  Patient On (Oxygen Delivery Method): room air        PHYSICAL EXAM:    GENERAL: alert, non verbal not in distress  CHEST/LUNG: b/l air entry  HEART: reg  ABDOMEN: soft, bs+, mild right LUQ and right LLQ tenderness  EXTREMITIES:  no edema, tenderness.    MEDICATIONS  (STANDING):  acetaZOLAMIDE    Tablet 250 milliGRAM(s) Oral two times a day  bisacodyl Suppository 10 milliGRAM(s) Rectal once  carBAMazepine ER Capsule 600 milliGRAM(s) Oral two times a day  cefepime  Injectable. 2000 milliGRAM(s) IV Push every 8 hours  diazepam    Tablet 2 milliGRAM(s) Oral two times a day  magnesium oxide 400 milliGRAM(s) Oral two times a day with meals  metroNIDAZOLE  IVPB      metroNIDAZOLE  IVPB 500 milliGRAM(s) IV Intermittent every 8 hours  senna 2 Tablet(s) Oral at bedtime  vancomycin  IVPB 1000 milliGRAM(s) IV Intermittent every 12 hours  zonisamide 25 milliGRAM(s) Oral two times a day    MEDICATIONS  (PRN):  acetaminophen     Tablet .. 650 milliGRAM(s) Oral every 6 hours PRN Temp greater or equal to 38C (100.4F), Mild Pain (1 - 3)  aluminum hydroxide/magnesium hydroxide/simethicone Suspension 30 milliLiter(s) Oral every 4 hours PRN Dyspepsia  melatonin 3 milliGRAM(s) Oral at bedtime PRN Insomnia  ondansetron Injectable 4 milliGRAM(s) IV Push every 8 hours PRN Nausea and/or Vomiting  polyethylene glycol 3350 17 Gram(s) Oral daily PRN Constipation      Allergies    Bee Sting (Swelling)  No Known Drug Allergies    Intolerances          LABS:                          11.8   6.39  )-----------( 169      ( 2023 06:17 )             34.1     02-14    140  |  109<H>  |  5.9<L>  ----------------------------<  113<H>  2.7<LL>   |  18.0<L>  |  0.51    Ca    8.1<L>      2023 06:17  Phos  2.5     02-13  Mg     1.7     02-14    TPro  5.7<L>  /  Alb  3.0<L>  /  TBili  0.4  /  DBili  x   /  AST  9   /  ALT  8   /  AlkPhos  58  02-13      Urinalysis Basic - ( 2023 20:41 )    Color: Yellow / Appearance: Slightly Turbid / S.010 / pH: x  Gluc: x / Ketone: Small  / Bili: Negative / Urobili: 4   Blood: x / Protein: 30 mg/dL / Nitrite: Positive   Leuk Esterase: Trace / RBC: 0-2 /HPF / WBC 6-10 /HPF   Sq Epi: x / Non Sq Epi: Moderate / Bacteria: Many        RADIOLOGY & ADDITIONAL TESTS:

## 2023-02-14 NOTE — PROGRESS NOTE ADULT - ASSESSMENT
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     CT AP showed small amount of fluid in the pelvis and left upper abdomen with mild loculation and enhancement suggestive of  peritonitis. Started on IV metronidazole + cefepime + vancomycin.       - CT AP results noted  - CSF cell count not impressive, but these results could be seen with indolent organisms   - No hypoglycorrhachia; normal protein. CSF LDH not obtained    - CSF gram neg; culture no growth thus far   - Unable to find more CSF to obtain CSF PCR panel   - IR consulted for possible percutaneous drainage of peritoneal fluid    - Continue empiric vanco + cefepime + metronidazole at current doses   - Slow vanco infusion given episode of EMILY  - Monitor vanco through; dose adjustment per pharmacy protocol. Monitor for vanco toxicity .   - Check full RVP   - no leukocytosis on admission, but had neutrophilia; monitor WBC   - Monitor fever curve    D/w Dr. Grimaldo  D/w mother Ana    Will follow

## 2023-02-14 NOTE — CHART NOTE - NSCHARTNOTEFT_GEN_A_CORE
02-14    140  |  109<H>  |  5.9<L>  ----------------------------<  113<H>  2.7<LL>   |  18.0<L>  |  0.51      Potassium, Serum: 2.7 mmol/L (02.14.23 @ 06:17)   Potassium, Serum: 3.1 mmol/L (02.13.23 @ 06:10)   Potassium, Serum: 4.2 mmol/L (02.12.23 @ 12:00)     Magnesium, Serum: 1.6 mg/dL (02.13.23 @ 06:10)     Will replace Mg and K.

## 2023-02-14 NOTE — CHART NOTE - NSCHARTNOTEFT_GEN_A_CORE
Cryptococcal Antigen - CSF: Negative  Culture - Fungal, CSF (02.12.23 @ 20:40)   Specimen Source: .CSF CSF   Culture Results:   Testing in progress Culture - CSF with Gram Stain . (02.12.23 @ 20:40)   Gram Stain:   polymorphonuclear leukocytes seen   No organisms seen   by cytocentrifuge   Specimen Source: .CSF CSF   Culture Results:   No growth Culture - Acid Fast - CSF (02.12.23 @ 20:40)   Specimen Source: .CSF CSF   Acid Fast Bacilli Smear:   Less than 5 cc of CSF received,   unable to perform AFB smear. Protein, CSF (02.12.23 @ 20:40)   Protein, CSF: 31 mg/dL Glucose, CSF (02.12.23 @ 20:40)   Glucose, CSF: 82 mg/dLG/24h Cerebrospinal Fluid Cell Count-1 (02.12.23 @ 20:40)   Appearance Spun: Colorless   CSF Appearance: Slightly Cloudy   CSF Neutrophils: N/A %   Tube Type: Tube 2   Total Nucleated Cell Count, CSF: 4 /uL   RBC Count - Spinal Fluid: 1450 /cmm   CSF Color: No Color     < from: CT Head No Cont (02.12.23 @ 15:45) >      IMPRESSION:    Changes from anterior bifrontal ventriculostomy catheters in place.  Anterior bifrontal encephalomalacia and gliosis.  Findings suggestive of callosal dysgenesis. Correlate with patient's   history.    < end of copied text >    < from: Xray Shunt Series (02.12.23 @ 17:06) >    FINDINGS:  There is a right-sided ventricular shunt noted which goes down   the right side of the neck crosses down the left side of the thorax and   enters the upper abdomen. It coils down to the pelvis with its tip in the   midabdomen. There is no evidence of discontinuation or kinking. Limited   evaluation of the paranasal sinuses are grossly clear. The lungs are   grossly clear. There is air distended loops of small and large bowel   noted. Contrast is seen in the kidneys, collecting system and bladder   from a recent CT.    < end of copied text >          CSF reviewed and appears clean/not infected. Pt being treated w/ empiric vanco + cefepime + metronidazole. No signs of shunt infection at this time. Pt still appears to have abdominal pain, +guarding and contracts all ext when abdomen examined. No leukocytosis. Afebrile. Pending possible percutaneous drainage of peritoneal fluid by IR to eval for infection.

## 2023-02-14 NOTE — CONSULT NOTE ADULT - SUBJECTIVE AND OBJECTIVE BOX
Patient is a 37y old  Female who presents with a chief complaint of altered mental status    HISTORY OF PRESENT ILLNESS:   37yF with pmhx of bacterial meningitis at 7 days old, hydrocephalus with hx of  shunt with 15+ revisions-last revision in 1998, seizures, GERD s/p Nissen fundoplication, and dental caries presents to ED with altered mental status per pt's mother. Pt is nonverbal at baseline. Pt's mother reports that pt has had decreased appetite for the last 2 days. Reports pt has not been as happy as usual for the last 2 days and her mother was not able to get the pt to walk with her this morning, which is atypical.      Neurosurgery consulted d/t abdominal free fluid concerning for infection in the setting of  shunt.     PAST MEDICAL & SURGICAL HISTORY:  History of seizures      History of hydrocephalus      H/O bacterial meningitis  @7 days old      Periodontal disease, unspecified      GERD (gastroesophageal reflux disease)      Dental caries on smooth surface penetrating into dentin      History of Nissen fundoplication  1985      Status post left foot surgery  heel cord lengthening      S/P  shunt  several last 1998      Status post breast lumpectomy  left; benign        FAMILY HISTORY:  FH: type 2 diabetes (Father, Mother)    Family history of hypothyroidism (Mother)    Family history of lung cancer (Mother)    Family hx of hypertension (Mother)        SOCIAL HISTORY:  Tobacco Use:  EtOH use:   Substance:    Allergies    Bee Sting (Swelling)  No Known Drug Allergies    Intolerances        REVIEW OF SYSTEMS  Unable to obtain secondary to pt's mental status    HOME MEDICATIONS:  Home Medications:  acetaZOLAMIDE 250 mg oral tablet: 1 tab(s) orally 2 times a day (04 Aug 2021 13:35)  diazePAM 2 mg oral tablet: 1 tab(s) orally 2 times a day (04 Aug 2021 13:35)  folic acid 1 mg oral tablet: 1 tab(s) orally once a day (04 Aug 2021 13:35)  Multiple Vitamins oral capsule: 1 cap(s) orally once a day (04 Aug 2021 13:35)  TEGretol 200 mg oral tablet: 3 tab(s) orally 2 times a day (04 Aug 2021 13:35)  Valtoco 5 mg Dose nasal spray: for seizures lasting more than 5 minutes (04 Aug 2021 12:49)  Vitamin C 1000 mg oral tablet: 1 tab(s) orally once a day (04 Aug 2021 13:35)  Vitamin D2 2000 intl units (50 mcg) oral capsule: 2 tab(s) orally once a day (04 Aug 2021 13:35)  Zonegran 25 mg oral capsule: 1 cap(s) orally 2 times a day (04 Aug 2021 13:35)      MEDICATIONS:  Antibiotics:    Neuro:    Anticoagulation:    OTHER:    IVF:      Vital Signs Last 24 Hrs  T(C): 38.3 (12 Feb 2023 14:00), Max: 38.3 (12 Feb 2023 14:00)  T(F): 100.9 (12 Feb 2023 14:00), Max: 100.9 (12 Feb 2023 14:00)  HR: 100 (12 Feb 2023 18:00) (100 - 116)  BP: 122/78 (12 Feb 2023 18:00) (122/78 - 126/88)  BP(mean): --  RR: 18 (12 Feb 2023 18:00) (17 - 18)  SpO2: 99% (12 Feb 2023 18:00) (98% - 99%)    Parameters below as of 12 Feb 2023 18:00  Patient On (Oxygen Delivery Method): room air          PHYSICAL EXAM:  GENERAL: NAD  HEAD:  Atraumatic  SHUNT: Easily compressible but takes 5+ seconds to refill  EYES: Conjunctiva and sclera clear  ENMT: Poor dentition  NECK: Supple  MENTAL STATUS: Pt nonverbal at baseline; Awake; Opens eyes spontaneously; Does not follow commands (baseline)  CRANIAL NERVES: PERRL. EOMs appear to be intact. Face symmetric w/ normal eye closure and smile.  REFLEXES: PERRL.   MOTOR: strength Moving RUE/RLE antigravity; L wrist in flexion, no movement of LUE, L foot 1/5   COORDINATION: Gait testing deferred  CHEST/LUNG: Nonlabored on room air  SKIN: Warm, dry    LABS:                        13.9   9.81  )-----------( 185      ( 12 Feb 2023 12:00 )             42.5     02-12    141  |  109<H>  |  17.6  ----------------------------<  122<H>  4.2   |  19.0<L>  |  0.56    Ca    8.9      12 Feb 2023 12:00    TPro  7.5  /  Alb  3.8  /  TBili  0.3<L>  /  DBili  x   /  AST  17  /  ALT  13  /  AlkPhos  73  02-12    PT/INR - ( 12 Feb 2023 12:00 )   PT: 16.4 sec;   INR: 1.41 ratio         PTT - ( 12 Feb 2023 12:00 )  PTT:26.1 sec      CULTURES:      RADIOLOGY & ADDITIONAL STUDIES:    CT Abdomen and Pelvis w/ Oral Cont and w/ IV Cont (02.12.23 @ 15:52)   BOWEL: Dilatation of bowel loops, without evidence of obstruction.   Appendix not visualized.  PERITONEUM:  shunt enters the epigastricregion and extends into the   right pelvis before extending laterally and cephalad to the level of the   left midabdomen.    Small amount of free fluid in the pelvis with small loculated pockets   lateral to the splenic flexure. Mild peripheral enhancement associated   with the peritoneal fluid  VESSELS: Within normal limits.  RETROPERITONEUM/LYMPH NODES: No lymphadenopathy.  ABDOMINAL WALL: Within normal limits.  BONES: Within normal limits.    IMPRESSION:  Ventriculoperitoneal shunt, as described.    Small amount of fluid in the pelvis and left upper abdomen with mild   loculation and enhancement suggesting peritonitis. Infection is a   possibility.      LARON VARGHESE MD; Attending Radiologist  This document has been electronically signed. Feb 12 2023  4:03PM      CT Head No Cont (02.12.23 @ 15:45)   IMPRESSION:    Changes from anterior bifrontal ventriculostomy catheters in place.  Anterior bifrontal encephalomalacia and gliosis.  Findings suggestive of callosal dysgenesis. Correlate with patient's   history.  No acute intracranial bleeding.      FRANCIE PADGETT MD; Attending Radiologist  This document has been electronically signed. Feb 12 2023  5:02PM      
  SURGERY CONSULT  ==============================================================================================================  HPI: 37y Female with PMH of meningitis, non-verbal, hydrocephalus, and multiple  shunts presents to theED with 2 days of decreased appetite and weakness noted by her mother. Mother at bedside reports that she has not been eating and every time she stands her up to walk she bends her knees.  Patient has constipation at baseline and got a suppository on Friday 2/10 that helped her to have BM.      PAST MEDICAL & SURGICAL HISTORY:  History of seizures      History of hydrocephalus      H/O bacterial meningitis  @7 days old      Periodontal disease, unspecified      GERD (gastroesophageal reflux disease)      Dental caries on smooth surface penetrating into dentin      History of Nissen fundoplication  1985      Status post left foot surgery  heel cord lengthening      S/P  shunt  several last 1998      Status post breast lumpectomy  left; benign        Home Meds: Home Medications:  acetaZOLAMIDE 250 mg oral tablet: 1 tab(s) orally 2 times a day (04 Aug 2021 13:35)  diazePAM 2 mg oral tablet: 1 tab(s) orally 2 times a day (04 Aug 2021 13:35)  folic acid 1 mg oral tablet: 1 tab(s) orally once a day (04 Aug 2021 13:35)  Multiple Vitamins oral capsule: 1 cap(s) orally once a day (04 Aug 2021 13:35)  TEGretol 200 mg oral tablet: 3 tab(s) orally 2 times a day (04 Aug 2021 13:35)  Valtoco 5 mg Dose nasal spray: for seizures lasting more than 5 minutes (04 Aug 2021 12:49)  Vitamin C 1000 mg oral tablet: 1 tab(s) orally once a day (04 Aug 2021 13:35)  Vitamin D2 2000 intl units (50 mcg) oral capsule: 2 tab(s) orally once a day (04 Aug 2021 13:35)  Zonegran 25 mg oral capsule: 1 cap(s) orally 2 times a day (04 Aug 2021 13:35)    Allergies: Allergies    Bee Sting (Swelling)  No Known Drug Allergies    Intolerances      Soc:   Advanced Directives: Presumed Full Code     CURRENT MEDICATIONS:   --------------------------------------------------------------------------------------  Neurologic Medications    Respiratory Medications    Cardiovascular Medications    Gastrointestinal Medications    Genitourinary Medications    Hematologic/Oncologic Medications    Antimicrobial/Immunologic Medications  vancomycin  IVPB. 1000 milliGRAM(s) IV Intermittent once    Endocrine/Metabolic Medications    Topical/Other Medications    --------------------------------------------------------------------------------------    VITAL SIGNS, INS/OUTS (last 24 hours):  --------------------------------------------------------------------------------------  ICU Vital Signs Last 24 Hrs  T(C): 38.3 (12 Feb 2023 14:00), Max: 38.3 (12 Feb 2023 14:00)  T(F): 100.9 (12 Feb 2023 14:00), Max: 100.9 (12 Feb 2023 14:00)  HR: 100 (12 Feb 2023 18:00) (100 - 116)  BP: 122/78 (12 Feb 2023 18:00) (122/78 - 126/88)  BP(mean): --  ABP: --  ABP(mean): --  RR: 18 (12 Feb 2023 18:00) (17 - 18)  SpO2: 99% (12 Feb 2023 18:00) (98% - 99%)    O2 Parameters below as of 12 Feb 2023 18:00  Patient On (Oxygen Delivery Method): room air          I&O's Summary    --------------------------------------------------------------------------------------    PHYSICAL EXAM:  GENERAL: NAD, non-verbal.  HEAD:  Atraumatic, R side  shunt.   EYES: EOMI, PERRLA  NECK: Supple, No JVD  CHEST/LUNG: non-labored breathing on room air.   HEART: Regular rate and rhythm; S1/S2  ABDOMEN: Soft, diffusely tender, voluntary guarding. Midline laparotomy scar and L side PEG tube scar well healed.   VASCULAR: Normal pulses, Normal capillary refill  EXTREMITIES:  2+ Peripheral Pulses, No cyanosis, No edema, contracted.       LABS  --------------------------------------------------------------------------------------  Labs:  CAPILLARY BLOOD GLUCOSE                              13.9   9.81  )-----------( 185      ( 12 Feb 2023 12:00 )             42.5       Auto Neutrophil %: 80.7 % (02-12-23 @ 12:00)  Auto Immature Granulocyte %: 0.3 % (02-12-23 @ 12:00)    02-12    141  |  109<H>  |  17.6  ----------------------------<  122<H>  4.2   |  19.0<L>  |  0.56      Calcium, Total Serum: 8.9 mg/dL (02-12-23 @ 12:00)      LFTs:             7.5  | 0.3  | 17       ------------------[73      ( 12 Feb 2023 12:00 )  3.8  | x    | 13          Lipase:x      Amylase:x         Blood Gas Venous - Lactate: 2.20 mmol/L (02-12-23 @ 12:00)      Coags:     16.4   ----< 1.41    ( 12 Feb 2023 12:00 )     26.1        CARDIAC MARKERS ( 12 Feb 2023 12:00 )  x     / <0.01 ng/mL / x     / x     / x                    --------------------------------------------------------------------------------------    OTHER LABS    IMAGING RESULTS  ****************      ASSESSMENT/ PLAN:  37y Female ***      Attending aware and agrees with plan
[facility]  INFECTIOUS DISEASES at [place] and [place]  =====================================================                               Rohan Garcia MD*    Mary Wolff MD*                             Sonia Hargrove MD*     Tracie Peterson MD*            Diplomates American Board of Internal Medicine & Infectious Diseases                * [place] Office - Appt - Tel  [phone] Fax [phone]                * [place] Office - Appt - Tel [phone] Fax [phone]                                  Hospital Consult line:  [phone]  =====================================================      [id number]  REJI MCGARRY        CC: Patient is a 37y old  Female who presents with a chief complaint of Severe Sepsis (2023 02:19)      HPI:  37F with remote history of bacterial meningitis at the age of 7 complicated by hydrocephalus s/p  Shunt with multiple revisions (last in ), epilepsy, GERD s/p Nissen fundoplication presented to [facility] ER on  with her mother (primary caregiver) for evaluation of fever, abdominal pain and lethargy which began 2 days ago.     In the ED febrile Tmax 100.9F, tachycardia and  hypotensive SBP 70s. Given empiric IV vanco and piperacillin-tazobactam. CT AP showed small amount of fluid in the pelvis and left upper abdomen with mild loculation and enhancement suggestive of  peritonitis. She was seen by surgery and neurosurgery Shunt tapped and then started on IV metronidazole + cefepime + vancomycin. Of note, developed EMILY with first vanco infusion, controlled with slower rate infusion.     At baseline patient is nonverbal, but always smiling per mom. She can ambulate a few steps when held by her mother.     ______________________________________________________  PAST MEDICAL & SURGICAL HISTORY:  History of seizures      History of hydrocephalus      H/O bacterial meningitis  @7 days old      Periodontal disease, unspecified      GERD (gastroesophageal reflux disease)      Dental caries on smooth surface penetrating into dentin      History of Nissen fundoplication        Status post left foot surgery  heel cord lengthening      S/P  shunt  several - last  after horseback riding accident       Status post breast lumpectomy  left; benign      Social History:  No etoh/tobacco/drug abuse       FAMILY HISTORY:  FH: type 2 diabetes (Father, Mother)    Family history of hypothyroidism (Mother)    Family history of lung cancer (Mother)    Family hx of hypertension (Mother)    Father  of COVID in       ______________________________________________________  Allergies    Bee Sting (Swelling)  No Known Drug Allergies    Intolerances    ______________________________________________________  MEDICATIONS:  Antibiotics:  cefepime  Injectable. 2000 milliGRAM(s) IV Push every 8 hours  metroNIDAZOLE  IVPB      metroNIDAZOLE  IVPB 500 milliGRAM(s) IV Intermittent every 8 hours  vancomycin  IVPB 1000 milliGRAM(s) IV Intermittent every 12 hours    Other medications:  acetaZOLAMIDE    Tablet 250 milliGRAM(s) Oral two times a day  carBAMazepine ER Capsule 600 milliGRAM(s) Oral two times a day  diazepam    Tablet 2 milliGRAM(s) Oral two times a day  lactated ringers. 1000 milliLiter(s) IV Continuous <Continuous>  zonisamide 25 milliGRAM(s) Oral two times a day    ______________________________________________________  REVIEW OF SYSTEMS:  Unable to obtain due to medical condition - patient nonverbal     _____________________________________________________  PHYSICAL EXAM:  GEN: chronically ill appearing but in NAD, awake.   HEENT: normocephalic and atraumatic. EOMI. PERRL.  Anicteric sclerae. Moist mucous membranes. Grinding teeth   NECK: Supple. No palpable neck masses or LN  LUNGS: eupneic, CTA B/L, no adventitious sounds  HEART: RRR, no m/r/g  ABDOMEN: Soft, ND, surgical scars, TTP in RLQ.   :  no Munoz catheter  EXTREMITIES: well perfused, without  edema.  MSK: hands contracted   LYMPH: no palpable cervical, supraclavicular lymph nodes  NEUROLOGIC: awake, but non verbal. Smiles back  SKIN: No rash, wounds or jaundice  LINES: PIV    ______________________________________________________      Vitals:  T(F): 98.1 (2023 07:24), Max: 100.9 (2023 14:00)  HR: 102 (2023 07:24)  BP: 104/71 (2023 07:24)  RR: 19 (2023 07:24)  SpO2: 97% (2023 07:24) (95% - 99%)  temp max in last 48H T(F): , Max: 100.9 (23 @ 14:00)    Current Antibiotics:  cefepime  Injectable. 2000 milliGRAM(s) IV Push every 8 hours  metroNIDAZOLE  IVPB      metroNIDAZOLE  IVPB 500 milliGRAM(s) IV Intermittent every 8 hours  vancomycin  IVPB 1000 milliGRAM(s) IV Intermittent every 12 hours    Other medications:  acetaZOLAMIDE    Tablet 250 milliGRAM(s) Oral two times a day  carBAMazepine ER Capsule 600 milliGRAM(s) Oral two times a day  diazepam    Tablet 2 milliGRAM(s) Oral two times a day  lactated ringers. 1000 milliLiter(s) IV Continuous <Continuous>  zonisamide 25 milliGRAM(s) Oral two times a day                            11.6   6.40  )-----------( 126      ( 2023 06:10 )             35.4         140  |  112<H>  |  9.5  ----------------------------<  94  3.1<L>   |  16.0<L>  |  0.42<L>    Ca    7.5<L>      2023 06:10  Phos  2.5       Mg     1.6         TPro  5.7<L>  /  Alb  3.0<L>  /  TBili  0.4  /  DBili  x   /  AST  9   /  ALT  8   /  AlkPhos  58      RECENT CULTURES:   @ 20:40 .CSF CSF       polymorphonuclear leukocytes seen  No organisms seen  by cytocentrifuge      WBC Count: 6.40 K/uL (23 @ 06:10)  WBC Count: 9.81 K/uL (23 @ 12:00)    Creatinine, Serum: 0.42 mg/dL (23 @ 06:10)  Creatinine, Serum: 0.56 mg/dL (23 @ 12:00)       SARS-CoV-2 Result: NotDetec (23 @ 12:00)    ______________________________________________________  RADIOLOGY  < from: CT Abdomen and Pelvis w/ Oral Cont and w/ IV Cont (23 @ 15:52) >  FINDINGS:  LOWER CHEST: Within normal limits.    LIVER: Within normal limits.  BILE DUCTS: Normal caliber.  GALLBLADDER: Within normal limits.  SPLEEN: Within normal limits.  PANCREAS: Within normal limits.  ADRENALS: Within normal limits.  KIDNEYS/URETERS: Within normal limits.    BLADDER: Within normal limits.  REPRODUCTIVE ORGANS: Uterus and bilateral adnexa within normal limits.    BOWEL: Dilatation of bowel loops, without evidence of obstruction.   Appendix not visualized.  PERITONEUM:  shunt enters the epigastricregion and extends into the   right pelvis before extending laterally and cephalad to the level of the   left midabdomen.    Small amount of free fluid in the pelvis with small loculated pockets   lateral to the splenic flexure. Mild peripheral enhancement associated   with the peritoneal fluid  VESSELS: Within normal limits.  RETROPERITONEUM/LYMPH NODES: No lymphadenopathy.  ABDOMINAL WALL: Within normal limits.  BONES: Within normal limits.    IMPRESSION:  Ventriculoperitoneal shunt, as described.    < from: CT Head No Cont (23 @ 15:45) >  INTERPRETATION:  HISTORY: Altered mental status.    COMPARISON: None.    TECHNIQUE: Axial noncontrast CT images from the skull base to the vertex   wereobtained and submitted for interpretation. Coronal and sagittal   reformatted images were performed. Bone and soft tissue windows were   evaluated.    FINDINGS:    There are two anterior approach ventriculostomy catheters in place. There   is a rightfrontal ventriculostomy catheter terminating in the expected   region of the body of the right lateral ventricle. Left parasagittal   frontal ventriculostomy catheter traverses through the third ventricle   and suprasellar cistern, terminating in theregion of the right   perimedullary cistern. No hydrocephalus.    There is anterior bifrontal encephalomalacia and gliosis with ex vacuo   dilatation of the frontal horns of the lateral ventricles bilaterally.   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    There is no acute intracranial mass-effect, hemorrhage, midline shift, or   abnormal extra-axial fluid collection. Visualized paranasal sinuses and   mastoid air cells are clear. Changes from biparietal craniotomies noted.    IMPRESSION:    Changes from anterior bifrontal ventriculostomy catheters in place.  Anterior bifrontal encephalomalacia and gliosis.  Findings suggestive of callosal dysgenesis. Correlate with patient's   history.  No acute intracranial bleeding.    < end of copied text >      Small amount of fluid in the pelvis and left upper abdomen with mild   loculation and enhancement suggesting peritonitis. Infection is a   possibility.    < end of copied text >  
Brief Hospital Course:  the patient is a 37 year-old female with a history of Citrobacter Meningitis c/b Hydrocephalus s/p  Shunt, Epilepsy, Nonverbal Baseline, GERD s/p Nissen Fundoplication presented to [facility] ED for fever and abdominal pain. In the ED, patient met sepsis criteria and was admitted for further management. CT AP noted Small amount of fluid in the pelvis and left upper abdomen with mild loculation and enhancement suggesting peritonitis. Infection is a possibility. IR consulted for evaluation for possible fluid collection drainage.   ============================================================================  Medications:  Home Medications:  acetaZOLAMIDE 250 mg oral tablet: 1 tab(s) orally 2 times a day (04 Aug 2021 13:35)  diazePAM 2 mg oral tablet: 1 tab(s) orally 2 times a day (04 Aug 2021 13:35)  TEGretol 200 mg oral tablet: 3 tab(s) orally 2 times a day (04 Aug 2021 13:35)  Valtoco 5 mg Dose nasal spray: for seizures lasting more than 5 minutes (04 Aug 2021 12:49)  Vitamin D2 2000 intl units (50 mcg) oral capsule: 2 tab(s) orally once a day (04 Aug 2021 13:35)  Zonegran 25 mg oral capsule: 1 cap(s) orally 2 times a day (04 Aug 2021 13:35)    MEDICATIONS  (STANDING):  acetaZOLAMIDE    Tablet 250 milliGRAM(s) Oral two times a day  carBAMazepine ER Capsule 600 milliGRAM(s) Oral two times a day  cefepime  Injectable. 2000 milliGRAM(s) IV Push every 8 hours  diazepam    Tablet 2 milliGRAM(s) Oral two times a day  magnesium oxide 400 milliGRAM(s) Oral two times a day with meals  metroNIDAZOLE  IVPB      metroNIDAZOLE  IVPB 500 milliGRAM(s) IV Intermittent every 8 hours  potassium chloride    Tablet ER 40 milliEquivalent(s) Oral every 4 hours  vancomycin  IVPB 1000 milliGRAM(s) IV Intermittent every 12 hours  zonisamide 25 milliGRAM(s) Oral two times a day    Allergies:   Bee Sting (Swelling)  No Known Drug Allergies  ============================================================================  PAST MEDICAL & SURGICAL HISTORY:  History of seizures    History of hydrocephalus    H/O bacterial meningitis  @7 days old    Periodontal disease, unspecified    GERD (gastroesophageal reflux disease)    Dental caries on smooth surface penetrating into dentin    History of Nissen fundoplication  1985    Status post left foot surgery  heel cord lengthening    S/P  shunt  several last 1998    Status post breast lumpectomy  left; benign    FAMILY HISTORY:  FH: type 2 diabetes (Father, Mother)    Family history of hypothyroidism (Mother)    Family history of lung cancer (Mother)    Family hx of hypertension (Mother)    Social History:  Denies etoh/tobacco/drug abuse (12 Feb 2023 23:11)  ============================================================================  Vitals:  Vital Signs Last 24 Hrs  T(C): 37.3 (14 Feb 2023 10:52), Max: 37.3 (14 Feb 2023 10:52)  T(F): 99.1 (14 Feb 2023 10:52), Max: 99.1 (14 Feb 2023 10:52)  HR: 96 (14 Feb 2023 10:52) (96 - 108)  BP: 114/75 (14 Feb 2023 10:52) (114/75 - 135/91)  BP(mean): --  RR: 19 (14 Feb 2023 10:52) (18 - 19)  SpO2: 98% (14 Feb 2023 10:52) (96% - 99%)    Parameters below as of 14 Feb 2023 04:32  Patient On (Oxygen Delivery Method): room air    Labs:                        11.8   6.39  )-----------( 169      ( 14 Feb 2023 06:17 )             34.1     02-14    140  |  109<H>  |  5.9<L>  ----------------------------<  113<H>  2.7<LL>   |  18.0<L>  |  0.51    Ca    8.1<L>      14 Feb 2023 06:17  Phos  2.5     02-13  Mg     1.7     02-14    TPro  5.7<L>  /  Alb  3.0<L>  /  TBili  0.4  /  DBili  x   /  AST  9   /  ALT  8   /  AlkPhos  58  02-13    Imaging:   Pertinent Imaging Reviewed.    ============================================================================

## 2023-02-15 LAB
-  AMIKACIN: SIGNIFICANT CHANGE UP
-  AMOXICILLIN/CLAVULANIC ACID: SIGNIFICANT CHANGE UP
-  AMPICILLIN/SULBACTAM: SIGNIFICANT CHANGE UP
-  AMPICILLIN: SIGNIFICANT CHANGE UP
-  AZTREONAM: SIGNIFICANT CHANGE UP
-  CEFAZOLIN: SIGNIFICANT CHANGE UP
-  CEFEPIME: SIGNIFICANT CHANGE UP
-  CEFOXITIN: SIGNIFICANT CHANGE UP
-  CEFTRIAXONE: SIGNIFICANT CHANGE UP
-  CEFUROXIME: SIGNIFICANT CHANGE UP
-  CIPROFLOXACIN: SIGNIFICANT CHANGE UP
-  ERTAPENEM: SIGNIFICANT CHANGE UP
-  GENTAMICIN: SIGNIFICANT CHANGE UP
-  IMIPENEM: SIGNIFICANT CHANGE UP
-  LEVOFLOXACIN: SIGNIFICANT CHANGE UP
-  MEROPENEM: SIGNIFICANT CHANGE UP
-  NITROFURANTOIN: SIGNIFICANT CHANGE UP
-  PIPERACILLIN/TAZOBACTAM: SIGNIFICANT CHANGE UP
-  TOBRAMYCIN: SIGNIFICANT CHANGE UP
-  TRIMETHOPRIM/SULFAMETHOXAZOLE: SIGNIFICANT CHANGE UP
ANION GAP SERPL CALC-SCNC: 13 MMOL/L — SIGNIFICANT CHANGE UP (ref 5–17)
BASOPHILS # BLD AUTO: 0.02 K/UL — SIGNIFICANT CHANGE UP (ref 0–0.2)
BASOPHILS NFR BLD AUTO: 0.4 % — SIGNIFICANT CHANGE UP (ref 0–2)
BUN SERPL-MCNC: 6.4 MG/DL — LOW (ref 8–20)
CALCIUM SERPL-MCNC: 8.5 MG/DL — SIGNIFICANT CHANGE UP (ref 8.4–10.5)
CHLORIDE SERPL-SCNC: 107 MMOL/L — SIGNIFICANT CHANGE UP (ref 96–108)
CO2 SERPL-SCNC: 17 MMOL/L — LOW (ref 22–29)
CREAT SERPL-MCNC: 0.4 MG/DL — LOW (ref 0.5–1.3)
CULTURE RESULTS: NO GROWTH — SIGNIFICANT CHANGE UP
CULTURE RESULTS: SIGNIFICANT CHANGE UP
EGFR: 131 ML/MIN/1.73M2 — SIGNIFICANT CHANGE UP
EOSINOPHIL # BLD AUTO: 0 K/UL — SIGNIFICANT CHANGE UP (ref 0–0.5)
EOSINOPHIL NFR BLD AUTO: 0 % — SIGNIFICANT CHANGE UP (ref 0–6)
GLUCOSE SERPL-MCNC: 105 MG/DL — HIGH (ref 70–99)
HCT VFR BLD CALC: 36.4 % — SIGNIFICANT CHANGE UP (ref 34.5–45)
HGB BLD-MCNC: 12.3 G/DL — SIGNIFICANT CHANGE UP (ref 11.5–15.5)
IMM GRANULOCYTES NFR BLD AUTO: 0.4 % — SIGNIFICANT CHANGE UP (ref 0–0.9)
LYMPHOCYTES # BLD AUTO: 0.55 K/UL — LOW (ref 1–3.3)
LYMPHOCYTES # BLD AUTO: 10.4 % — LOW (ref 13–44)
MAGNESIUM SERPL-MCNC: 1.9 MG/DL — SIGNIFICANT CHANGE UP (ref 1.6–2.6)
MCHC RBC-ENTMCNC: 30.8 PG — SIGNIFICANT CHANGE UP (ref 27–34)
MCHC RBC-ENTMCNC: 33.8 GM/DL — SIGNIFICANT CHANGE UP (ref 32–36)
MCV RBC AUTO: 91.2 FL — SIGNIFICANT CHANGE UP (ref 80–100)
METHOD TYPE: SIGNIFICANT CHANGE UP
MONOCYTES # BLD AUTO: 0.57 K/UL — SIGNIFICANT CHANGE UP (ref 0–0.9)
MONOCYTES NFR BLD AUTO: 10.8 % — SIGNIFICANT CHANGE UP (ref 2–14)
NEUTROPHILS # BLD AUTO: 4.13 K/UL — SIGNIFICANT CHANGE UP (ref 1.8–7.4)
NEUTROPHILS NFR BLD AUTO: 78 % — HIGH (ref 43–77)
ORGANISM # SPEC MICROSCOPIC CNT: SIGNIFICANT CHANGE UP
ORGANISM # SPEC MICROSCOPIC CNT: SIGNIFICANT CHANGE UP
PHOSPHATE SERPL-MCNC: 2.9 MG/DL — SIGNIFICANT CHANGE UP (ref 2.4–4.7)
PLATELET # BLD AUTO: 200 K/UL — SIGNIFICANT CHANGE UP (ref 150–400)
POTASSIUM SERPL-MCNC: 3.8 MMOL/L — SIGNIFICANT CHANGE UP (ref 3.5–5.3)
POTASSIUM SERPL-SCNC: 3.8 MMOL/L — SIGNIFICANT CHANGE UP (ref 3.5–5.3)
RBC # BLD: 3.99 M/UL — SIGNIFICANT CHANGE UP (ref 3.8–5.2)
RBC # FLD: 12.7 % — SIGNIFICANT CHANGE UP (ref 10.3–14.5)
SODIUM SERPL-SCNC: 137 MMOL/L — SIGNIFICANT CHANGE UP (ref 135–145)
SPECIMEN SOURCE: SIGNIFICANT CHANGE UP
SPECIMEN SOURCE: SIGNIFICANT CHANGE UP
VANCOMYCIN TROUGH SERPL-MCNC: 19.6 UG/ML — SIGNIFICANT CHANGE UP (ref 10–20)
WBC # BLD: 5.29 K/UL — SIGNIFICANT CHANGE UP (ref 3.8–10.5)
WBC # FLD AUTO: 5.29 K/UL — SIGNIFICANT CHANGE UP (ref 3.8–10.5)

## 2023-02-15 PROCEDURE — ZZZZZ: CPT

## 2023-02-15 PROCEDURE — 99233 SBSQ HOSP IP/OBS HIGH 50: CPT

## 2023-02-15 PROCEDURE — 99232 SBSQ HOSP IP/OBS MODERATE 35: CPT

## 2023-02-15 RX ORDER — LEVOFLOXACIN 5 MG/ML
500 INJECTION, SOLUTION INTRAVENOUS EVERY 24 HOURS
Refills: 0 | Status: DISCONTINUED | OUTPATIENT
Start: 2023-02-15 | End: 2023-02-15

## 2023-02-15 RX ORDER — HEPARIN SODIUM 5000 [USP'U]/ML
5000 INJECTION INTRAVENOUS; SUBCUTANEOUS EVERY 12 HOURS
Refills: 0 | Status: DISCONTINUED | OUTPATIENT
Start: 2023-02-15 | End: 2023-02-16

## 2023-02-15 RX ORDER — METRONIDAZOLE 500 MG
500 TABLET ORAL EVERY 8 HOURS
Refills: 0 | Status: DISCONTINUED | OUTPATIENT
Start: 2023-02-15 | End: 2023-02-16

## 2023-02-15 RX ADMIN — Medication 600 MILLIGRAM(S): at 18:01

## 2023-02-15 RX ADMIN — Medication 600 MILLIGRAM(S): at 06:02

## 2023-02-15 RX ADMIN — Medication 500 MILLIGRAM(S): at 14:14

## 2023-02-15 RX ADMIN — HEPARIN SODIUM 5000 UNIT(S): 5000 INJECTION INTRAVENOUS; SUBCUTANEOUS at 18:00

## 2023-02-15 RX ADMIN — Medication 2 MILLIGRAM(S): at 06:02

## 2023-02-15 RX ADMIN — Medication 10 MILLIGRAM(S): at 14:13

## 2023-02-15 RX ADMIN — Medication 25 MILLIGRAM(S): at 18:02

## 2023-02-15 RX ADMIN — Medication 500 MILLIGRAM(S): at 21:20

## 2023-02-15 RX ADMIN — Medication 100 MILLIGRAM(S): at 06:01

## 2023-02-15 RX ADMIN — ACETAZOLAMIDE 250 MILLIGRAM(S): 250 TABLET ORAL at 06:01

## 2023-02-15 RX ADMIN — Medication 25 MILLIGRAM(S): at 06:01

## 2023-02-15 RX ADMIN — Medication 2 MILLIGRAM(S): at 18:02

## 2023-02-15 RX ADMIN — ACETAZOLAMIDE 250 MILLIGRAM(S): 250 TABLET ORAL at 18:02

## 2023-02-15 RX ADMIN — SENNA PLUS 2 TABLET(S): 8.6 TABLET ORAL at 21:20

## 2023-02-15 RX ADMIN — CEFEPIME 2000 MILLIGRAM(S): 1 INJECTION, POWDER, FOR SOLUTION INTRAMUSCULAR; INTRAVENOUS at 06:00

## 2023-02-15 NOTE — DIETITIAN INITIAL EVALUATION ADULT - OTHER INFO
37F with PMHX Citrobacter Meningitis c/b Hydrocephalus (Age 7d) s/p  Shunt s/p Revision x15+, Epilepsy, Nonverbal Baseline, GERD s/p Nissen Fundoplication presents to [facility] ER with Mother (primary caregiver) for evaluation of fever and abdominal pain with listless and lethargy which began 2 days ago. Lactic Acidosis on labs. Febrile in ED Tmax 100.9F. Tachycardic on arrival. Given empiric IV ABX with Vanco and Zosyn for sepsis. CT AP imaging with Peritonitis and fluid collection in setting of  shunt. CTH and Shunt Series reviewed. Surgery and Neurosurgery Consulted.  shunt tapped and cultures pending.  Pt admitted with sepsis 2/2 peritonitis.

## 2023-02-15 NOTE — SWALLOW BEDSIDE ASSESSMENT ADULT - ORAL PREPARATORY PHASE
varied oral holding, reduced attention to bolus/Reduced oral grading reduced attention to bolus, +varied oral holding (baseline per mother), reduced mastication at this time for regular solids (per mother, likely - impacted by lethargy and overall reduced appetite)/Refuses to accept bolus into oral cavity reduced attention to bolus, +varied oral holding (baseline per mother)/Reduced oral grading reduced attention to bolus, +varied oral holding (baseline per mother)/Reduced oral grading/Decreased mastication ability no

## 2023-02-15 NOTE — DIETITIAN INITIAL EVALUATION ADULT - ADD RECOMMEND
Add Ensure BID; Rx MVI and vit C 500mg daily; Encourage HBV protein sources; Monitor bowel function, bowel regimen PRN; Obtain/provide food preferences daily to inc PO

## 2023-02-15 NOTE — SWALLOW BEDSIDE ASSESSMENT ADULT - SWALLOW EVAL: PATIENT/FAMILY GOALS STATEMENT
none stated; mother would like upgrade to regular diet, however I agreement to maintain soft/bite size while pt not at her baseline

## 2023-02-15 NOTE — PROGRESS NOTE ADULT - SUBJECTIVE AND OBJECTIVE BOX
INTERVAL HPI/OVERNIGHT EVENTS:    CC: sepsis sec peritonitis vs shunt infection, hydrocephalus s/p  shunt, seizures    No overnight events  seen this am  more alert  had BM yesterday    Vital Signs Last 24 Hrs  T(C): 37.1 (15 Feb 2023 21:14), Max: 37.2 (15 Feb 2023 05:49)  T(F): 98.8 (15 Feb 2023 21:14), Max: 98.9 (15 Feb 2023 05:49)  HR: 105 (15 Feb 2023 21:14) (98 - 110)  BP: 121/86 (15 Feb 2023 21:14) (104/70 - 121/86)  BP(mean): --  RR: 18 (15 Feb 2023 21:14) (18 - 19)  SpO2: 99% (15 Feb 2023 21:14) (95% - 99%)    Parameters below as of 15 Feb 2023 21:14  Patient On (Oxygen Delivery Method): room air        PHYSICAL EXAM:    GENERAL: more alert, non verbal, comfortable  CHEST/LUNG: b/l air entry  HEART: reg  ABDOMEN: soft, bs+  EXTREMITIES:  no edema, tenderness    MEDICATIONS  (STANDING):  acetaZOLAMIDE    Tablet 250 milliGRAM(s) Oral two times a day  carBAMazepine ER Capsule 600 milliGRAM(s) Oral two times a day  diazepam    Tablet 2 milliGRAM(s) Oral two times a day  heparin   Injectable 5000 Unit(s) SubCutaneous every 12 hours  levoFLOXacin  Tablet 750 milliGRAM(s) Oral every 24 hours  metroNIDAZOLE    Tablet 500 milliGRAM(s) Oral every 8 hours  senna 2 Tablet(s) Oral at bedtime  zonisamide 25 milliGRAM(s) Oral two times a day    MEDICATIONS  (PRN):  acetaminophen     Tablet .. 650 milliGRAM(s) Oral every 6 hours PRN Temp greater or equal to 38C (100.4F), Mild Pain (1 - 3)  aluminum hydroxide/magnesium hydroxide/simethicone Suspension 30 milliLiter(s) Oral every 4 hours PRN Dyspepsia  melatonin 3 milliGRAM(s) Oral at bedtime PRN Insomnia  ondansetron Injectable 4 milliGRAM(s) IV Push every 8 hours PRN Nausea and/or Vomiting  polyethylene glycol 3350 17 Gram(s) Oral daily PRN Constipation      Allergies    Bee Sting (Swelling)  No Known Drug Allergies    Intolerances          LABS:                          12.3   5.29  )-----------( 200      ( 15 Feb 2023 06:33 )             36.4     02-15    137  |  107  |  6.4<L>  ----------------------------<  105<H>  3.8   |  17.0<L>  |  0.40<L>    Ca    8.5      15 Feb 2023 06:33  Phos  2.9     02-15  Mg     1.9     02-15            RADIOLOGY & ADDITIONAL TESTS:

## 2023-02-15 NOTE — DIETITIAN INITIAL EVALUATION ADULT - ORAL INTAKE PTA/DIET HISTORY
Interview conducted with Pts mother at bedside. Pt tolerates a regular diet consistency at home PTA, discussed with SLP to continue Soft & Bite Sized diet 2/2 lethargy. Pts UBW ~110lbs has remained consistent, height 5" reported. Chronic constipation per family, provides a suppository 2x/week. Pt tolerating bowel regimen, last BM 2/15. Decreased PO intake in house, accepts pudding, ice cream and yogurt well. Food preferences obtained communicated to diet office.

## 2023-02-15 NOTE — SWALLOW BEDSIDE ASSESSMENT ADULT - SLP PERTINENT HISTORY OF CURRENT PROBLEM
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 CT head on admission, with Changes from anterior bifrontal ventriculostomy catheters in place. Anterior bifrontal encephalomalacia and gliosis. Findings suggestive of callosal dysgenesis. CT abdomen revealed Ventriculoperitoneal shunt. Small amount of fluid in the pelvis and left upper abdomen with mild loculation and enhancement suggesting peritonitis. Infection is a possibility. Cultures were sent. Neurosurgery and general surgery was consulted. Empiric antibiotics were initiated. CSF sent for analysis by neurosurgery. She was given Vancomycin, Cefepime and Metronidazole. IR consulted to assess pelvic collections for drainage, advised to continue current antibiotics and re image if she clinically worsens. Her mental status improved. Blood and CSF cultures were negative.

## 2023-02-15 NOTE — SWALLOW BEDSIDE ASSESSMENT ADULT - SLP GENERAL OBSERVATIONS
Pt received A&A in bed, nonverbal, reduced cognition, unable to  follow commands, increasing lethargy as eval progressed and requiring encouragement to accept PO  (mom reports reduced appetite with current illness), nonverbal pain 0/10 pre/post.

## 2023-02-15 NOTE — PROGRESS NOTE ADULT - SUBJECTIVE AND OBJECTIVE BOX
[facility]  INFECTIOUS DISEASES at [place] and [place]  ===============================================================                               Rohan Garcia MD*     Mary Wolff MD*                         Sonia Hargrove MD*       Tracie Peterson MD*            Diplomates American Board of Internal Medicine & Infectious Diseases                * [place] Office - Appt - Tel  [phone] Fax [phone]                * [place] Office - Appt - Tel [phone] Fax [phone]                                  Hospital Consult line:  [phone]  ==============================================================    REJI MCGARRY [id number]    Follow up: peritonitis, possible  shunt infection     No acute events  Temp 100 last night  hemodynamically stable   Mom at bedside     I have personally reviewed the labs and data; pertinent labs and data are listed in this note; please see below.     _______________________________________________________________  REVIEW OF SYSTEMS  Unable to obtain due to medical condition - patient non verbal   ________________________________________________________________  Allergies:  Bee Sting (Swelling)  No Known Drug Allergies    _______________________________________________________________  PHYSICAL EXAM  GEN: NAD, sitting up in bed with mom   HEENT: Anicteric sclerae   LUNGS: eupneic.   : No Munoz catheter  EXTREMITIES: well perfused, without  edema.  NEUROLOGIC: awake, calm, smiling, non verbal   LINES: PIV  ________________________________________________________________  Vitals:  T(F): 97.9 (15 Feb 2023 10:47), Max: 100.4 (14 Feb 2023 21:10)  HR: 98 (15 Feb 2023 10:47)  BP: 105/74 (15 Feb 2023 10:47)  RR: 19 (15 Feb 2023 10:47)  SpO2: 95% (15 Feb 2023 05:49) (95% - 98%)  temp max in last 48H T(F): , Max: 100.4 (02-14-23 @ 21:10)    Current Antibiotics:  levoFLOXacin  Tablet 750 milliGRAM(s) Oral every 24 hours  metroNIDAZOLE    Tablet 500 milliGRAM(s) Oral every 8 hours    Other medications:  acetaZOLAMIDE    Tablet 250 milliGRAM(s) Oral two times a day  bisacodyl Suppository 10 milliGRAM(s) Rectal once  carBAMazepine ER Capsule 600 milliGRAM(s) Oral two times a day  diazepam    Tablet 2 milliGRAM(s) Oral two times a day  heparin   Injectable 5000 Unit(s) SubCutaneous every 12 hours  senna 2 Tablet(s) Oral at bedtime  zonisamide 25 milliGRAM(s) Oral two times a day                            12.3   5.29  )-----------( 200      ( 15 Feb 2023 06:33 )             36.4     02-15    137  |  107  |  6.4<L>  ----------------------------<  105<H>  3.8   |  17.0<L>  |  0.40<L>    Ca    8.5      15 Feb 2023 06:33  Phos  2.9     02-15  Mg     1.9     02-15      RECENT CULTURES:  02-12 @ 20:41 Clean Catch Clean Catch (Midstream) Escherichia coli    50,000 - 99,000 CFU/mL Escherichia coli      02-12 @ 20:40 .CSF CSF     No growth    polymorphonuclear leukocytes seen  No organisms seen  by cytocentrifuge      02-12 @ 11:00 .Blood Blood-Peripheral     No growth to date.      WBC Count: 5.29 K/uL (02-15-23 @ 06:33)  WBC Count: 6.39 K/uL (02-14-23 @ 06:17)  WBC Count: 6.40 K/uL (02-13-23 @ 06:10)  WBC Count: 9.81 K/uL (02-12-23 @ 12:00)    Creatinine, Serum: 0.40 mg/dL (02-15-23 @ 06:33)  Creatinine, Serum: 0.51 mg/dL (02-14-23 @ 06:17)  Creatinine, Serum: 0.42 mg/dL (02-13-23 @ 06:10)  Creatinine, Serum: 0.56 mg/dL (02-12-23 @ 12:00)     SARS-CoV-2 Result: NotDetec (02-12-23 @ 12:00)    ________________________________________________________________  RADIOLOGY  < from: CT Abdomen and Pelvis w/ Oral Cont and w/ IV Cont (02.12.23 @ 15:52) >  FINDINGS:  LOWER CHEST: Within normal limits.    LIVER: Within normal limits.  BILE DUCTS: Normal caliber.  GALLBLADDER: Within normal limits.  SPLEEN: Within normal limits.  PANCREAS: Within normal limits.  ADRENALS: Within normal limits.  KIDNEYS/URETERS: Within normal limits.    BLADDER: Within normal limits.  REPRODUCTIVE ORGANS: Uterus and bilateral adnexa within normal limits.    BOWEL: Dilatation of bowel loops, without evidence of obstruction.   Appendix not visualized.  PERITONEUM:  shunt enters the epigastricregion and extends into the   right pelvis before extending laterally and cephalad to the level of the   left midabdomen.    Small amount of free fluid in the pelvis with small loculated pockets   lateral to the splenic flexure. Mild peripheral enhancement associated   with the peritoneal fluid  VESSELS: Within normal limits.  RETROPERITONEUM/LYMPH NODES: No lymphadenopathy.  ABDOMINAL WALL: Within normal limits.  BONES: Within normal limits.    IMPRESSION:  Ventriculoperitoneal shunt, as described.    Small amount of fluid in the pelvis and left upper abdomen with mild   loculation and enhancement suggesting peritonitis. Infection is a   possibility.    < end of copied text >

## 2023-02-15 NOTE — DIETITIAN INITIAL EVALUATION ADULT - PERTINENT MEDS FT
MEDICATIONS  (STANDING):  acetaZOLAMIDE    Tablet 250 milliGRAM(s) Oral two times a day  carBAMazepine ER Capsule 600 milliGRAM(s) Oral two times a day  diazepam    Tablet 2 milliGRAM(s) Oral two times a day  heparin   Injectable 5000 Unit(s) SubCutaneous every 12 hours  levoFLOXacin  Tablet 750 milliGRAM(s) Oral every 24 hours  metroNIDAZOLE    Tablet 500 milliGRAM(s) Oral every 8 hours  senna 2 Tablet(s) Oral at bedtime  zonisamide 25 milliGRAM(s) Oral two times a day    MEDICATIONS  (PRN):  acetaminophen     Tablet .. 650 milliGRAM(s) Oral every 6 hours PRN Temp greater or equal to 38C (100.4F), Mild Pain (1 - 3)  aluminum hydroxide/magnesium hydroxide/simethicone Suspension 30 milliLiter(s) Oral every 4 hours PRN Dyspepsia  melatonin 3 milliGRAM(s) Oral at bedtime PRN Insomnia  ondansetron Injectable 4 milliGRAM(s) IV Push every 8 hours PRN Nausea and/or Vomiting  polyethylene glycol 3350 17 Gram(s) Oral daily PRN Constipation

## 2023-02-15 NOTE — SWALLOW BEDSIDE ASSESSMENT ADULT - ORAL PHASE
Decreased anterior-posterior movement of the bolus/Delayed oral transit time mild lingual stasis, +benefit  from verbal/tactile cues for second swallow./Decreased anterior-posterior movement of the bolus/Delayed oral transit time Decreased anterior-posterior movement of the bolus/Delayed oral transit time/Lingual stasis

## 2023-02-15 NOTE — SWALLOW BEDSIDE ASSESSMENT ADULT - SWALLOW EVAL: DIAGNOSIS
At least moderate oral dysphagia for administered consistencies, confounded by lethargy and reduced appetite at present per mother. Pharyngeal stage of swallow appears grossly functional with no overt s/s aspiration at bedside

## 2023-02-15 NOTE — PROGRESS NOTE ADULT - ASSESSMENT
ASSESSMENT  37F PMH childhood hydrocephalus s/p  shunt placement with 15+ revisions, most recently 1998, seizures, presented with AMS and abdominal pain, found to have abdominal fluid collection with concern for  shunt infection.  - shunt tap NGTD  - IR consulted for fluid tap, deferring for now, recc. conservative treatment    PLAN  - case d/w team  - no acute neurosurgical intervention indicated at this time, csf without signs of infection at this time, f/u offical cx results  - recc repeat CTH, today 2/15 for stability  - ID following, reccs appreciated, on empiric abx  - surgery following, reccs conservative treatment for now, reccs appreciated  - pain control as needed, avoid over sedation  - normotension  - SCDs for dvt ppx  - further medical care per primary team  - will continue to follow

## 2023-02-15 NOTE — DIETITIAN INITIAL EVALUATION ADULT - PERTINENT LABORATORY DATA
02-15    137  |  107  |  6.4<L>  ----------------------------<  105<H>  3.8   |  17.0<L>  |  0.40<L>    Ca    8.5      15 Feb 2023 06:33  Phos  2.9     02-15  Mg     1.9     02-15

## 2023-02-15 NOTE — PROGRESS NOTE ADULT - ASSESSMENT
37F with remote history of bacterial meningitis at the age of 7 complicated by hydrocephalus s/p  Shunt with multiple revisions (last in 1998), epilepsy, GERD s/p Nissen fundoplication presented to [facility] ER on 2/12 fever, abdominal pain and lethargy for 2 days ago.     CT AP showed small amount of fluid in the pelvis and left upper abdomen with mild loculation and enhancement suggestive of  peritonitis. Started on IV metronidazole + cefepime + vancomycin.       - CT AP results noted  - CSF cell count without significant pleocytosis    - No hypoglycorrhachia; normal protein. CSF LDH not obtained    - CSF gram neg; culture no growth thus far   - No percutaneous drainage of peritoneal fluid   - OK to change antimicrobials to levo 750 mg PO daily plus metronidazole 500 mg PO q8h through 3/3   - no leukocytosis on admission, but had neutrophilia, since resolved.   - Monitor fever curve    D/w Dr. Grimaldo  D/w mother Ana    Will follow

## 2023-02-15 NOTE — PROGRESS NOTE ADULT - SUBJECTIVE AND OBJECTIVE BOX
HPI: 37F with PMHX Citrobacter Meningitis c/b Hydrocephalus (Age 7d) s/p  Shunt s/p Revision x15+, Epilepsy, Nonverbal Baseline, GERD s/p Nissen Fundoplication presents to [facility] ER with Mother (primary caregiver) for evaluation of fever and abdominal pain with listless and lethargy which began 2 days ago. Lactic Acidosis on labs. Febrile in ED Tmax 100.9F. Tachycardic on arrival. Given empiric IV ABX with Vanco and Zosyn for sepsis. CT AP imaging with Peritonitis and fluid collection in setting of  shunt. CTH and Shunt Series reviewed. Surgery and Neurosurgery Consulted.  shunt tapped and cultures pending. Patient seen and examined. Hypotensive on admission with SBP 70s for which she was given additional 30cc/kg IVFB NSS with resolution of hypotension. Patient developed red man syndrome after 900mg of 1g Vanco dose which also resolved. Med list and history confirmed with mother at bedside.     ROS limited 2/2 nonverbal baseline and severity of illness. (12 Feb 2023 23:11)      INTERVAL HPI/OVERNIGHT EVENTS:  37y Female seen lying comfortably in bed. No acute over night events. Pending repeat CTH today.    Vital Signs Last 24 Hrs  T(C): 37.2 (15 Feb 2023 05:49), Max: 38 (14 Feb 2023 21:10)  T(F): 98.9 (15 Feb 2023 05:49), Max: 100.4 (14 Feb 2023 21:10)  HR: 110 (15 Feb 2023 05:49) (96 - 114)  BP: 104/70 (15 Feb 2023 05:49) (104/70 - 114/77)  BP(mean): --  RR: 18 (15 Feb 2023 05:49) (18 - 19)  SpO2: 95% (15 Feb 2023 05:49) (95% - 98%)    Parameters below as of 15 Feb 2023 05:49  Patient On (Oxygen Delivery Method): room air        PHYSICAL EXAM:  GENERAL: NAD, well-groomed  HEAD:  Atraumatic, normocephalic  MENTAL STATUS: Nonverbal at baseline. Not following commands (baseline) Opens eyes spontaneously.  CRANIAL NERVES: PERRL. Face symmetric.  MOTOR: HURTADO spontaneously  SENSATION: grossly intact to light touch all extremities      LABS:                        12.3   5.29  )-----------( 200      ( 15 Feb 2023 06:33 )             36.4     02-15    137  |  107  |  6.4<L>  ----------------------------<  105<H>  3.8   |  17.0<L>  |  0.40<L>    Ca    8.5      15 Feb 2023 06:33  Phos  2.9     02-15  Mg     1.9     02-15              RADIOLOGY & ADDITIONAL TESTS:  Assessment and Recommendation:   · Assessment	  37 year-old female admitted for severe sepsis. Seen in consultation for fluid collection drainage. CT AP reviewed, two small collections noted in the lower left abdomen. Given the size of these collections, recommend deferring drainage at this time and continuing conservative management with IV antibiotics. If patient's symptoms being to worsen clinically recommend repeating CT imaging.       < from: Xray Shunt Series (02.12.23 @ 17:06) >  INTERPRETATION:  HISTORY:  Shunt series    TECHNIQUE:  AP and lateral views of the skull, chest, abdomen and pelvis   were obtained.    FINDINGS:  There is a right-sided ventricular shunt noted which goes down   the right side of the neck crosses down the left side of the thorax and   enters the upper abdomen. It coils down to the pelvis with its tip in the   midabdomen. There is no evidence of discontinuation or kinking. Limited   evaluation of the paranasal sinuses are grossly clear. The lungs are   grossly clear. There is air distended loops of small and large bowel   noted. Contrast is seen in the kidneys, collecting system and bladder   from a recent CT.    IMPRESSION:   shunt as above.    < end of copied text >          CAPRINI SCORE [CLOT]:  Patient has an estimated Caprini score of greater than 5.  However, the patient's unique clinical situation will be addressed in an individual manner to determine appropriate anticoagulation treatment, if any. HPI: 37F with PMHX Citrobacter Meningitis c/b Hydrocephalus (Age 7d) s/p  Shunt s/p Revision x15+, Epilepsy, Nonverbal Baseline, GERD s/p Nissen Fundoplication presents to [facility] ER with Mother (primary caregiver) for evaluation of fever and abdominal pain with listless and lethargy which began 2 days ago. Lactic Acidosis on labs. Febrile in ED Tmax 100.9F. Tachycardic on arrival. Given empiric IV ABX with Vanco and Zosyn for sepsis. CT AP imaging with Peritonitis and fluid collection in setting of  shunt. CTH and Shunt Series reviewed. Surgery and Neurosurgery Consulted.  shunt tapped and cultures pending. Patient seen and examined. Hypotensive on admission with SBP 70s for which she was given additional 30cc/kg IVFB NSS with resolution of hypotension. Patient developed red man syndrome after 900mg of 1g Vanco dose which also resolved. Med list and history confirmed with mother at bedside.     ROS limited 2/2 nonverbal baseline and severity of illness. (12 Feb 2023 23:11)      INTERVAL HPI/OVERNIGHT EVENTS:  37y Female seen lying comfortably in bed. No acute over night events. Pending repeat CTH today.    Vital Signs Last 24 Hrs  T(C): 37.2 (15 Feb 2023 05:49), Max: 38 (14 Feb 2023 21:10)  T(F): 98.9 (15 Feb 2023 05:49), Max: 100.4 (14 Feb 2023 21:10)  HR: 110 (15 Feb 2023 05:49) (96 - 114)  BP: 104/70 (15 Feb 2023 05:49) (104/70 - 114/77)  BP(mean): --  RR: 18 (15 Feb 2023 05:49) (18 - 19)  SpO2: 95% (15 Feb 2023 05:49) (95% - 98%)    Parameters below as of 15 Feb 2023 05:49  Patient On (Oxygen Delivery Method): room air        PHYSICAL EXAM:  GENERAL: NAD, well-groomed, pleasant  HEAD:  Atraumatic, normocephalic  MENTAL STATUS: Nonverbal at baseline. Not following commands (baseline) Opens eyes spontaneously.  CRANIAL NERVES: PERRL. Face symmetric.  MOTOR: HURTADO spontaneously, b/l UE AG when prompted to hold hands, b/l LE WD to light touch  SENSATION: grossly intact to light touch all extremities      LABS:                        12.3   5.29  )-----------( 200      ( 15 Feb 2023 06:33 )             36.4     02-15    137  |  107  |  6.4<L>  ----------------------------<  105<H>  3.8   |  17.0<L>  |  0.40<L>    Ca    8.5      15 Feb 2023 06:33  Phos  2.9     02-15  Mg     1.9     02-15              RADIOLOGY & ADDITIONAL TESTS:  Assessment and Recommendation:   · Assessment	  37 year-old female admitted for severe sepsis. Seen in consultation for fluid collection drainage. CT AP reviewed, two small collections noted in the lower left abdomen. Given the size of these collections, recommend deferring drainage at this time and continuing conservative management with IV antibiotics. If patient's symptoms being to worsen clinically recommend repeating CT imaging.       < from: Xray Shunt Series (02.12.23 @ 17:06) >  INTERPRETATION:  HISTORY:  Shunt series    TECHNIQUE:  AP and lateral views of the skull, chest, abdomen and pelvis   were obtained.    FINDINGS:  There is a right-sided ventricular shunt noted which goes down   the right side of the neck crosses down the left side of the thorax and   enters the upper abdomen. It coils down to the pelvis with its tip in the   midabdomen. There is no evidence of discontinuation or kinking. Limited   evaluation of the paranasal sinuses are grossly clear. The lungs are   grossly clear. There is air distended loops of small and large bowel   noted. Contrast is seen in the kidneys, collecting system and bladder   from a recent CT.    IMPRESSION:   shunt as above.    < end of copied text >          CAPRINI SCORE [CLOT]:  Patient has an estimated Caprini score of greater than 5.  However, the patient's unique clinical situation will be addressed in an individual manner to determine appropriate anticoagulation treatment, if any.

## 2023-02-15 NOTE — PROGRESS NOTE ADULT - ASSESSMENT
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 CT head on admission, with Changes from anterior bifrontal ventriculostomy catheters in place. Anterior bifrontal encephalomalacia and gliosis. Findings suggestive of callosal dysgenesis. CT abdomen revealed Ventriculoperitoneal shunt. Small amount of fluid in the pelvis and left upper abdomen with mild loculation and enhancement suggesting peritonitis. Infection is a possibility. Cultures were sent. Neurosurgery and general surgery was consulted. Empiric antibiotics were initiated. CSF sent for analysis by neurosurgery. She was given Vancomycin, Cefepime and Metronidazole. IR consulted to assess pelvic collections for drainage, advised to continue current antibiotics and re image if she clinically worsens. Her mental status improved. Blood and CSF cultures were negative. Her mental status improved. ID recommended transitioning to Levofloxacin and Flagyl PO to be given until 3/3/23. Repeat head CT recommended by neurosurgery.    1. Sepsis sec peritonitis:  Transitioned to PO Levofloxacin and Flagyl as per ID  repeat CT head as per neurosurgery.  neurosurgery and IR recommendations noted  no plans for drainage as per IR.      2. Metabolic encephalopathy sec sepsis:  Improving  speech/swallow evaluation noted.    3. Seizures and hydrocephalus:  Continue Acetazolamide  On Valium, Carbamazepine, Zonisamide.     4. DVT ppx:  Heparin     Discussed with patient's mother  and ID.  If CT stable, anticipate discharge home in am.

## 2023-02-16 ENCOUNTER — TRANSCRIPTION ENCOUNTER (OUTPATIENT)
Age: 38
End: 2023-02-16

## 2023-02-16 VITALS
DIASTOLIC BLOOD PRESSURE: 69 MMHG | HEART RATE: 97 BPM | OXYGEN SATURATION: 97 % | RESPIRATION RATE: 18 BRPM | SYSTOLIC BLOOD PRESSURE: 102 MMHG | TEMPERATURE: 98 F

## 2023-02-16 LAB
ANION GAP SERPL CALC-SCNC: 13 MMOL/L — SIGNIFICANT CHANGE UP (ref 5–17)
BUN SERPL-MCNC: 8.8 MG/DL — SIGNIFICANT CHANGE UP (ref 8–20)
CALCIUM SERPL-MCNC: 8.7 MG/DL — SIGNIFICANT CHANGE UP (ref 8.4–10.5)
CHLORIDE SERPL-SCNC: 105 MMOL/L — SIGNIFICANT CHANGE UP (ref 96–108)
CO2 SERPL-SCNC: 18 MMOL/L — LOW (ref 22–29)
CREAT SERPL-MCNC: 0.42 MG/DL — LOW (ref 0.5–1.3)
EGFR: 129 ML/MIN/1.73M2 — SIGNIFICANT CHANGE UP
GLUCOSE SERPL-MCNC: 94 MG/DL — SIGNIFICANT CHANGE UP (ref 70–99)
POTASSIUM SERPL-MCNC: 3.6 MMOL/L — SIGNIFICANT CHANGE UP (ref 3.5–5.3)
POTASSIUM SERPL-SCNC: 3.6 MMOL/L — SIGNIFICANT CHANGE UP (ref 3.5–5.3)
SODIUM SERPL-SCNC: 136 MMOL/L — SIGNIFICANT CHANGE UP (ref 135–145)

## 2023-02-16 PROCEDURE — 99285 EMERGENCY DEPT VISIT HI MDM: CPT | Mod: 25

## 2023-02-16 PROCEDURE — 80048 BASIC METABOLIC PNL TOTAL CA: CPT

## 2023-02-16 PROCEDURE — 81001 URINALYSIS AUTO W/SCOPE: CPT

## 2023-02-16 PROCEDURE — 84132 ASSAY OF SERUM POTASSIUM: CPT

## 2023-02-16 PROCEDURE — 84484 ASSAY OF TROPONIN QUANT: CPT

## 2023-02-16 PROCEDURE — 85027 COMPLETE CBC AUTOMATED: CPT

## 2023-02-16 PROCEDURE — 84100 ASSAY OF PHOSPHORUS: CPT

## 2023-02-16 PROCEDURE — 89051 BODY FLUID CELL COUNT: CPT

## 2023-02-16 PROCEDURE — 83605 ASSAY OF LACTIC ACID: CPT

## 2023-02-16 PROCEDURE — 87070 CULTURE OTHR SPECIMN AEROBIC: CPT

## 2023-02-16 PROCEDURE — 84702 CHORIONIC GONADOTROPIN TEST: CPT

## 2023-02-16 PROCEDURE — 85610 PROTHROMBIN TIME: CPT

## 2023-02-16 PROCEDURE — 71045 X-RAY EXAM CHEST 1 VIEW: CPT

## 2023-02-16 PROCEDURE — 36415 COLL VENOUS BLD VENIPUNCTURE: CPT

## 2023-02-16 PROCEDURE — 82435 ASSAY OF BLOOD CHLORIDE: CPT

## 2023-02-16 PROCEDURE — 96374 THER/PROPH/DIAG INJ IV PUSH: CPT

## 2023-02-16 PROCEDURE — 83735 ASSAY OF MAGNESIUM: CPT

## 2023-02-16 PROCEDURE — 86403 PARTICLE AGGLUT ANTBDY SCRN: CPT

## 2023-02-16 PROCEDURE — 74177 CT ABD & PELVIS W/CONTRAST: CPT | Mod: MA

## 2023-02-16 PROCEDURE — 84157 ASSAY OF PROTEIN OTHER: CPT

## 2023-02-16 PROCEDURE — 80053 COMPREHEN METABOLIC PANEL: CPT

## 2023-02-16 PROCEDURE — 87116 MYCOBACTERIA CULTURE: CPT

## 2023-02-16 PROCEDURE — 74018 RADEX ABDOMEN 1 VIEW: CPT

## 2023-02-16 PROCEDURE — 87205 SMEAR GRAM STAIN: CPT

## 2023-02-16 PROCEDURE — 82947 ASSAY GLUCOSE BLOOD QUANT: CPT

## 2023-02-16 PROCEDURE — 70450 CT HEAD/BRAIN W/O DYE: CPT | Mod: MA

## 2023-02-16 PROCEDURE — 87102 FUNGUS ISOLATION CULTURE: CPT

## 2023-02-16 PROCEDURE — 99239 HOSP IP/OBS DSCHRG MGMT >30: CPT

## 2023-02-16 PROCEDURE — 70450 CT HEAD/BRAIN W/O DYE: CPT | Mod: 26

## 2023-02-16 PROCEDURE — 80202 ASSAY OF VANCOMYCIN: CPT

## 2023-02-16 PROCEDURE — 87040 BLOOD CULTURE FOR BACTERIA: CPT

## 2023-02-16 PROCEDURE — 82803 BLOOD GASES ANY COMBINATION: CPT

## 2023-02-16 PROCEDURE — 85014 HEMATOCRIT: CPT

## 2023-02-16 PROCEDURE — 85018 HEMOGLOBIN: CPT

## 2023-02-16 PROCEDURE — 85025 COMPLETE CBC W/AUTO DIFF WBC: CPT

## 2023-02-16 PROCEDURE — 84295 ASSAY OF SERUM SODIUM: CPT

## 2023-02-16 PROCEDURE — 87086 URINE CULTURE/COLONY COUNT: CPT

## 2023-02-16 PROCEDURE — 70250 X-RAY EXAM OF SKULL: CPT

## 2023-02-16 PROCEDURE — 96375 TX/PRO/DX INJ NEW DRUG ADDON: CPT

## 2023-02-16 PROCEDURE — 82330 ASSAY OF CALCIUM: CPT

## 2023-02-16 PROCEDURE — 99232 SBSQ HOSP IP/OBS MODERATE 35: CPT

## 2023-02-16 PROCEDURE — 82945 GLUCOSE OTHER FLUID: CPT

## 2023-02-16 PROCEDURE — 85730 THROMBOPLASTIN TIME PARTIAL: CPT

## 2023-02-16 PROCEDURE — 93005 ELECTROCARDIOGRAM TRACING: CPT

## 2023-02-16 PROCEDURE — 87186 SC STD MICRODIL/AGAR DIL: CPT

## 2023-02-16 PROCEDURE — 87637 SARSCOV2&INF A&B&RSV AMP PRB: CPT

## 2023-02-16 RX ORDER — SENNA PLUS 8.6 MG/1
2 TABLET ORAL
Qty: 60 | Refills: 0
Start: 2023-02-16 | End: 2023-03-17

## 2023-02-16 RX ORDER — METRONIDAZOLE 500 MG
1 TABLET ORAL
Qty: 48 | Refills: 0
Start: 2023-02-16 | End: 2023-03-03

## 2023-02-16 RX ORDER — DIAZEPAM 5 MG
0 TABLET ORAL
Qty: 0 | Refills: 0 | DISCHARGE

## 2023-02-16 RX ORDER — LEVOFLOXACIN 5 MG/ML
1 INJECTION, SOLUTION INTRAVENOUS
Qty: 16 | Refills: 0
Start: 2023-02-16 | End: 2023-03-03

## 2023-02-16 RX ADMIN — Medication 500 MILLIGRAM(S): at 15:02

## 2023-02-16 RX ADMIN — Medication 600 MILLIGRAM(S): at 05:31

## 2023-02-16 RX ADMIN — Medication 500 MILLIGRAM(S): at 05:30

## 2023-02-16 RX ADMIN — ACETAZOLAMIDE 250 MILLIGRAM(S): 250 TABLET ORAL at 05:30

## 2023-02-16 RX ADMIN — Medication 25 MILLIGRAM(S): at 05:31

## 2023-02-16 RX ADMIN — HEPARIN SODIUM 5000 UNIT(S): 5000 INJECTION INTRAVENOUS; SUBCUTANEOUS at 05:30

## 2023-02-16 RX ADMIN — Medication 2 MILLIGRAM(S): at 05:30

## 2023-02-16 NOTE — CHART NOTE - NSCHARTNOTEFT_GEN_A_CORE
NS ADDN    Case discussed with Dr. Cohen.   CSF cultures finalized no growth.   CW abx per ID,   dispo planning per primary team.   Discussed with medicine.   Follow up with primary NS.   Thank you.

## 2023-02-16 NOTE — DISCHARGE NOTE PROVIDER - CARE PROVIDER_API CALL
Tracie Bray)  Infectious Disease; Internal Medicine  [address] [place] [address]  Phone: [phone]  Fax: [phone]  Follow Up Time:     Douglas Cohen; PhD)  Neurosurgery  [address] [place] [address]  Phone: [phone]  Fax: [phone]  Follow Up Time:

## 2023-02-16 NOTE — DISCHARGE NOTE PROVIDER - NSDCMRMEDTOKEN_GEN_ALL_CORE_FT
acetaZOLAMIDE 250 mg oral tablet: 1 tab(s) orally 2 times a day  diazePAM 2 mg oral tablet: 1 tab(s) orally 2 times a day  TEGretol 200 mg oral tablet: 3 tab(s) orally 2 times a day  Valtoco 5 mg Dose nasal spray: for seizures lasting more than 5 minutes  Vitamin D2 2000 intl units (50 mcg) oral capsule: 2 tab(s) orally once a day  Zonegran 25 mg oral capsule: 1 cap(s) orally 2 times a day   acetaZOLAMIDE 250 mg oral tablet: 1 tab(s) orally 2 times a day  bisacodyl 10 mg rectal suppository: 1 suppository(ies) rectally once a day as needed for constipation  diazePAM 2 mg oral tablet: 1 tab(s) orally 2 times a day  levoFLOXacin 750 mg oral tablet: 1 tab(s) orally every 24 hours  metroNIDAZOLE 500 mg oral tablet: 1 tab(s) orally every 8 hours  senna leaf extract oral tablet: 2 tab(s) orally once a day (at bedtime), hold for diarrhea  TEGretol 200 mg oral tablet: 3 tab(s) orally 2 times a day  Valtoco 5 mg Dose nasal spray: nasal once a day, As Needed for seizures lasting more than 5 mins  Vitamin D2 2000 intl units (50 mcg) oral capsule: 2 tab(s) orally once a day  Zonegran 25 mg oral capsule: 1 cap(s) orally 2 times a day

## 2023-02-16 NOTE — PROGRESS NOTE ADULT - NS ATTEND AMEND GEN_ALL_CORE FT
I have examined the pt and reviewed the images and agree with the above plan.
I have examined the pt and reviewed the images and agree with the above plan.
I explained to the mother of the pt the plan of management in details.

## 2023-02-16 NOTE — PROGRESS NOTE ADULT - SUBJECTIVE AND OBJECTIVE BOX
INTERVAL HPI/OVERNIGHT EVENTS:    CC: sepsis sec peritonitis vs shunt infection, hydrocephalus s/p  shunt, seizures    No overnight events  overnight CT stable    Vital Signs Last 24 Hrs  T(C): 36.9 (16 Feb 2023 11:48), Max: 37.1 (15 Feb 2023 21:14)  T(F): 98.4 (16 Feb 2023 11:48), Max: 98.8 (15 Feb 2023 21:14)  HR: 97 (16 Feb 2023 11:48) (97 - 105)  BP: 102/69 (16 Feb 2023 11:48) (102/69 - 121/86)  BP(mean): --  RR: 18 (16 Feb 2023 11:48) (18 - 18)  SpO2: 97% (16 Feb 2023 11:48) (97% - 99%)    Parameters below as of 16 Feb 2023 11:48  Patient On (Oxygen Delivery Method): room air        PHYSICAL EXAM:    GENERAL: alert, not in distress  CHEST/LUNG: b/l air entry  HEART: reg  ABDOMEN: soft, bs+  EXTREMITIES:  no edema, tendernes    MEDICATIONS  (STANDING):  acetaZOLAMIDE    Tablet 250 milliGRAM(s) Oral two times a day  carBAMazepine ER Capsule 600 milliGRAM(s) Oral two times a day  diazepam    Tablet 2 milliGRAM(s) Oral two times a day  heparin   Injectable 5000 Unit(s) SubCutaneous every 12 hours  levoFLOXacin  Tablet 750 milliGRAM(s) Oral every 24 hours  metroNIDAZOLE    Tablet 500 milliGRAM(s) Oral every 8 hours  senna 2 Tablet(s) Oral at bedtime  zonisamide 25 milliGRAM(s) Oral two times a day    MEDICATIONS  (PRN):  acetaminophen     Tablet .. 650 milliGRAM(s) Oral every 6 hours PRN Temp greater or equal to 38C (100.4F), Mild Pain (1 - 3)  aluminum hydroxide/magnesium hydroxide/simethicone Suspension 30 milliLiter(s) Oral every 4 hours PRN Dyspepsia  melatonin 3 milliGRAM(s) Oral at bedtime PRN Insomnia  ondansetron Injectable 4 milliGRAM(s) IV Push every 8 hours PRN Nausea and/or Vomiting  polyethylene glycol 3350 17 Gram(s) Oral daily PRN Constipation      Allergies    Bee Sting (Swelling)  No Known Drug Allergies    Intolerances          LABS:                          12.3   5.29  )-----------( 200      ( 15 Feb 2023 06:33 )             36.4     02-16    136  |  105  |  8.8  ----------------------------<  94  3.6   |  18.0<L>  |  0.42<L>    Ca    8.7      16 Feb 2023 06:16  Phos  2.9     02-15  Mg     1.9     02-15            RADIOLOGY & ADDITIONAL TESTS:

## 2023-02-16 NOTE — PROGRESS NOTE ADULT - ASSESSMENT
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 CT head on admission, with Changes from anterior bifrontal ventriculostomy catheters in place. Anterior bifrontal encephalomalacia and gliosis. Findings suggestive of callosal dysgenesis. CT abdomen revealed Ventriculoperitoneal shunt. Small amount of fluid in the pelvis and left upper abdomen with mild loculation and enhancement suggesting peritonitis. Infection is a possibility. Cultures were sent. Neurosurgery and general surgery was consulted. Empiric antibiotics were initiated. CSF sent for analysis by neurosurgery. She was given Vancomycin, Cefepime and Metronidazole. IR consulted to assess pelvic collections for drainage, advised to continue current antibiotics and re image if she clinically worsens. Her mental status improved. Blood and CSF cultures were negative. Her mental status improved. ID recommended transitioning to Levofloxacin and Flagyl PO to be given until 3/3/23. Repeat head CT recommended by neurosurgery.    1. Sepsis sec peritonitis:  Transitioned to PO Levofloxacin and Flagyl as per ID  repeat CT head as per neurosurgery.  neurosurgery and IR recommendations noted  no plans for drainage as per IR.  repeat CT head negative.    2. Metabolic encephalopathy sec sepsis:  Improving  speech/swallow evaluation noted.    3. Seizures and hydrocephalus:  Continue Acetazolamide  On Valium, Carbamazepine, Zonisamide.     4. DVT ppx:  Heparin     Discussed with patient's mother at bedside.  Stable for discharge home.

## 2023-02-16 NOTE — DISCHARGE NOTE PROVIDER - NSDCCPCAREPLAN_GEN_ALL_CORE_FT
PRINCIPAL DISCHARGE DIAGNOSIS  Diagnosis: Peritonitis  Assessment and Plan of Treatment:        PRINCIPAL DISCHARGE DIAGNOSIS  Diagnosis: Peritonitis  Assessment and Plan of Treatment: complete course of Levofloxacin and FLgyl until 3/3/23  follow up with ID.      SECONDARY DISCHARGE DIAGNOSES  Diagnosis: Seizure  Assessment and Plan of Treatment: continue anti epileptics as instructed.    Diagnosis: S/P  shunt  Assessment and Plan of Treatment: CT stable  follow up with neurosurgery

## 2023-02-16 NOTE — PROGRESS NOTE ADULT - SUBJECTIVE AND OBJECTIVE BOX
NS PA Note  HD#4    HPI  37F with PMHX Citrobacter Meningitis p/w Hydrocephalus (Age 7d) s/p  Shunt s/p Revision x15+, Epilepsy, Nonverbal Baseline, GERD s/p Nissen Fundoplication presents to [facility] ER with Mother (primary caregiver) for evaluation of fever and abdominal pain with listless and lethargy which began 2 days ago. Lactic Acidosis on labs. Febrile in ED Tmax 100.9F. Tachycardic on arrival. Given empiric IV ABX with Vanco and Zosyn for sepsis. CT AP imaging with Peritonitis and fluid collection in setting of  shunt. CTH and Shunt Series reviewed. Surgery and Neurosurgery Consulted.  shunt tapped and cultures pending. Patient seen and examined. Hypotensive on admission with SBP 70s for which she was given additional 30cc/kg IVFB NSS with resolution of hypotension. Patient developed red man syndrome after 900mg of 1g Vanco dose which also resolved. Med list and history confirmed with mother at bedside.     Interval/Overnight Events  Patient seen bedside this morning. Family present bedside. Patient brighter, family agreeable that she looks much better, acting closer to baseline. Dr. Cohen reviewed outpt [facility] imaging which was done about 7 months ago. No acute events recorded overnight. No fevers. HR tachy at times. On levofloxacin and metronidazole for abdominal abscess coverage. Shunt was tapped 2/12, CSF final results still pending.     Vital Signs Last 24 Hrs  T(C): 36.6 (16 Feb 2023 05:20), Max: 37.1 (15 Feb 2023 21:14)  T(F): 97.9 (16 Feb 2023 05:20), Max: 98.8 (15 Feb 2023 21:14)  HR: 98 (16 Feb 2023 05:20) (98 - 105)  BP: 117/74 (16 Feb 2023 05:20) (105/74 - 121/86)  BP(mean): --  RR: 18 (16 Feb 2023 05:20) (18 - 19)  SpO2: 99% (16 Feb 2023 05:20) (99% - 99%)    Parameters below as of 16 Feb 2023 05:20  Patient On (Oxygen Delivery Method): room air    MEDICATIONS  (STANDING):  acetaZOLAMIDE    Tablet 250 milliGRAM(s) Oral two times a day  carBAMazepine ER Capsule 600 milliGRAM(s) Oral two times a day  diazepam    Tablet 2 milliGRAM(s) Oral two times a day  heparin   Injectable 5000 Unit(s) SubCutaneous every 12 hours  levoFLOXacin  Tablet 750 milliGRAM(s) Oral every 24 hours  metroNIDAZOLE    Tablet 500 milliGRAM(s) Oral every 8 hours  senna 2 Tablet(s) Oral at bedtime  zonisamide 25 milliGRAM(s) Oral two times a day    MEDICATIONS  (PRN):  acetaminophen     Tablet .. 650 milliGRAM(s) Oral every 6 hours PRN Temp greater or equal to 38C (100.4F), Mild Pain (1 - 3)  aluminum hydroxide/magnesium hydroxide/simethicone Suspension 30 milliLiter(s) Oral every 4 hours PRN Dyspepsia  melatonin 3 milliGRAM(s) Oral at bedtime PRN Insomnia  ondansetron Injectable 4 milliGRAM(s) IV Push every 8 hours PRN Nausea and/or Vomiting  polyethylene glycol 3350 17 Gram(s) Oral daily PRN Constipation    Neuro- Awake, alert, smiling, non-verbal   pupils 3mm bilaterally and sluggish, tracks  face grossly symmetric  mimics some commands like lifting her RUE AG  LUE appears weaker and is contracted at the wrist with a drop   withdraws b/l LEs R>L  Shunt- pumps and refills, appears on imaging to be a medtronic pressure valve                           12.3   5.29  )-----------( 200      ( 15 Feb 2023 06:33 )             36.4   02-16    136  |  105  |  8.8  ----------------------------<  94  3.6   |  18.0<L>  |  0.42<L>    Ca    8.7      16 Feb 2023 06:16  Phos  2.9     02-15  Mg     1.9     02-15    Blood Cx- NGTD  CSF- no organisms seen, NGTD  Urine- + UTI    ACC: [id number] EXAM:  XR SHUNT SERIES#   ORDERED BY: AARON SMILEY     PROCEDURE DATE:  02/12/2023          INTERPRETATION:  HISTORY:  Shunt series    TECHNIQUE:  AP and lateral views of the skull, chest, abdomen and pelvis   were obtained.    FINDINGS:  There is a right-sided ventricular shunt noted which goes down   the right side of the neck crosses down the left side of the thorax and   enters the upper abdomen. It coils down to the pelvis with its tip in the   midabdomen. There is no evidence of discontinuation or kinking. Limited   evaluation of the paranasal sinuses are grossly clear. The lungs are   grossly clear. There is air distended loops of small and large bowel   noted. Contrast is seen in the kidneys, collecting system and bladder   from a recent CT.    IMPRESSION:   shunt as above.    --- End of Report ---          LINDSEY DONATO MD; Attending Radiologist  This document has been electronically signed.  LINDSEY DONATO MD; Attending Radiologist  This document has been electronically signed. Feb 13 2023  4:56PM    [facility] 2/16- completed/ reviewed, appears stable, official read pending

## 2023-02-16 NOTE — DISCHARGE NOTE NURSING/CASE MANAGEMENT/SOCIAL WORK - NSDCPEFALRISK_GEN_ALL_CORE
For information on Fall & Injury Prevention, visit: [email or web address] OR  [email or web address] OR  https://www.cdc.gov/steadi/patient.html

## 2023-02-16 NOTE — PROGRESS NOTE ADULT - PROVIDER SPECIALTY LIST ADULT
Neurosurgery
Neurosurgery
Hospitalist
Neurosurgery
Surgery
Infectious Disease
Infectious Disease
Neurosurgery

## 2023-02-16 NOTE — PROGRESS NOTE ADULT - ASSESSMENT
Plan:  -NS stable, clinical exam improvement   -no plans for acute NS intervention at this time  -will follow up official CSF cultures to final  -neuro checks q4 hrs  -appreciate ID recs, c/w abx  -appreciate surgery recs, conservative tx for now  -SCDs in bed, on SQH DVT ppx  -Incentive spirometry if patient can participate  -consider PT/OT evals   -seen by Dr. Cohen who also spoke with the family bedside and reviewed available imaging    Plan:  -NS stable, clinical exam improvement, outpt imaging reviewed through [facility] by Dr. Cohen, imaging c/w available imaging here, no significant changes   -no plans for acute NS intervention at this time  -will follow up official CSF cultures to final  -neuro checks q4 hrs  -appreciate ID recs, c/w abx  -appreciate surgery recs, conservative tx for now  -SCDs in bed, on SQH DVT ppx  -Incentive spirometry if patient can participate  -consider PT/OT evals   -seen by Dr. Cohen who also spoke with the family bedside and reviewed available imaging

## 2023-02-16 NOTE — DISCHARGE NOTE PROVIDER - HOSPITAL COURSE
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 CT head on admission, with Changes from anterior bifrontal ventriculostomy catheters in place. Anterior bifrontal encephalomalacia and gliosis. Findings suggestive of callosal dysgenesis. CT abdomen revealed Ventriculoperitoneal shunt. Small amount of fluid in the pelvis and left upper abdomen with mild loculation and enhancement suggesting peritonitis. Infection is a possibility. Cultures were sent. Neurosurgery and general surgery was consulted. Empiric antibiotics were initiated. CSF sent for analysis by neurosurgery. She was given Vancomycin, Cefepime and Metronidazole. IR consulted to assess pelvic collections for drainage, advised to continue current antibiotics and re image if she clinically worsens. Her mental status improved. Blood and CSF cultures were negative. Her mental status improved. ID recommended transitioning to Levofloxacin and Flagyl PO to be given until 3/3/23. Repeat head CT recommended by neurosurgery. CT was stable. She is medically stable for discharge home.    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 CT head on admission, with Changes from anterior bifrontal ventriculostomy catheters in place. Anterior bifrontal encephalomalacia and gliosis. Findings suggestive of callosal dysgenesis. CT abdomen revealed Ventriculoperitoneal shunt. Small amount of fluid in the pelvis and left upper abdomen with mild loculation and enhancement suggesting peritonitis. Infection is a possibility. Cultures were sent. Neurosurgery and general surgery was consulted. Empiric antibiotics were initiated. CSF sent for analysis by neurosurgery. She was given Vancomycin, Cefepime and Metronidazole. IR consulted to assess pelvic collections for drainage, advised to continue current antibiotics and re image if she clinically worsens. Her mental status improved. Blood and CSF cultures were negative. Her mental status improved. ID recommended transitioning to Levofloxacin and Flagyl PO to be given until 3/3/23. Repeat head CT recommended by neurosurgery. CT was stable. She is medically stable for discharge home.     Resume home care services no restrictions.

## 2023-02-16 NOTE — DISCHARGE NOTE NURSING/CASE MANAGEMENT/SOCIAL WORK - PATIENT PORTAL LINK FT
You can access the FollowMyHealth Patient Portal offered by [facility] by registering at the following website: [email or web address]. By joining [employer] [facility]’s FollowMyHealth portal, you will also be able to view your health information using other applications (apps) compatible with our system.

## 2023-02-17 ENCOUNTER — NON-APPOINTMENT (OUTPATIENT)
Age: 38
End: 2023-02-17

## 2023-02-17 LAB
CULTURE RESULTS: SIGNIFICANT CHANGE UP
CULTURE RESULTS: SIGNIFICANT CHANGE UP
SPECIMEN SOURCE: SIGNIFICANT CHANGE UP
SPECIMEN SOURCE: SIGNIFICANT CHANGE UP

## 2023-02-18 ENCOUNTER — TRANSCRIPTION ENCOUNTER (OUTPATIENT)
Age: 38
End: 2023-02-18

## 2023-02-23 ENCOUNTER — NON-APPOINTMENT (OUTPATIENT)
Age: 38
End: 2023-02-23

## 2023-02-23 ENCOUNTER — TRANSCRIPTION ENCOUNTER (OUTPATIENT)
Age: 38
End: 2023-02-23

## 2023-02-28 ENCOUNTER — APPOINTMENT (OUTPATIENT)
Dept: FAMILY MEDICINE | Facility: CLINIC | Age: 38
End: 2023-02-28
Payer: MEDICARE

## 2023-02-28 VITALS
HEART RATE: 96 BPM | BODY MASS INDEX: 23.84 KG/M2 | OXYGEN SATURATION: 99 % | DIASTOLIC BLOOD PRESSURE: 68 MMHG | SYSTOLIC BLOOD PRESSURE: 110 MMHG | WEIGHT: 121.44 LBS | TEMPERATURE: 98.1 F | HEIGHT: 60 IN | RESPIRATION RATE: 16 BRPM

## 2023-02-28 DIAGNOSIS — Z09 ENCOUNTER FOR FOLLOW-UP EXAMINATION AFTER COMPLETED TREATMENT FOR CONDITIONS OTHER THAN MALIGNANT NEOPLASM: ICD-10-CM

## 2023-02-28 DIAGNOSIS — Z78.9 OTHER SPECIFIED HEALTH STATUS: ICD-10-CM

## 2023-02-28 DIAGNOSIS — K65.9 PERITONITIS, UNSPECIFIED: ICD-10-CM

## 2023-02-28 DIAGNOSIS — K59.09 OTHER CONSTIPATION: ICD-10-CM

## 2023-02-28 PROCEDURE — 99495 TRANSJ CARE MGMT MOD F2F 14D: CPT | Mod: 25

## 2023-02-28 PROCEDURE — 36415 COLL VENOUS BLD VENIPUNCTURE: CPT

## 2023-03-01 LAB
ALBUMIN SERPL ELPH-MCNC: 4.3 G/DL
ALP BLD-CCNC: 55 U/L
ALT SERPL-CCNC: 12 U/L
ANION GAP SERPL CALC-SCNC: 14 MMOL/L
AST SERPL-CCNC: 10 U/L
BASOPHILS # BLD AUTO: 0.03 K/UL
BASOPHILS NFR BLD AUTO: 0.6 %
BILIRUB SERPL-MCNC: 0.2 MG/DL
BUN SERPL-MCNC: 16 MG/DL
CALCIUM SERPL-MCNC: 9.1 MG/DL
CHLORIDE SERPL-SCNC: 111 MMOL/L
CO2 SERPL-SCNC: 17 MMOL/L
CREAT SERPL-MCNC: 0.63 MG/DL
EGFR: 117 ML/MIN/1.73M2
EOSINOPHIL # BLD AUTO: 0 K/UL
EOSINOPHIL NFR BLD AUTO: 0 %
FOLATE SERPL-MCNC: >20 NG/ML
GLUCOSE SERPL-MCNC: 100 MG/DL
HCT VFR BLD CALC: 45.3 %
HGB BLD-MCNC: 14.6 G/DL
IMM GRANULOCYTES NFR BLD AUTO: 0.2 %
LYMPHOCYTES # BLD AUTO: 0.84 K/UL
LYMPHOCYTES NFR BLD AUTO: 18.1 %
MAN DIFF?: NORMAL
MCHC RBC-ENTMCNC: 30.7 PG
MCHC RBC-ENTMCNC: 32.2 GM/DL
MCV RBC AUTO: 95.4 FL
MONOCYTES # BLD AUTO: 0.41 K/UL
MONOCYTES NFR BLD AUTO: 8.9 %
NEUTROPHILS # BLD AUTO: 3.34 K/UL
NEUTROPHILS NFR BLD AUTO: 72.2 %
PLATELET # BLD AUTO: 270 K/UL
POTASSIUM SERPL-SCNC: 4.2 MMOL/L
PROT SERPL-MCNC: 6.8 G/DL
RBC # BLD: 4.75 M/UL
RBC # FLD: 13.5 %
SODIUM SERPL-SCNC: 142 MMOL/L
TSH SERPL-ACNC: 1.21 UIU/ML
VIT B12 SERPL-MCNC: 1282 PG/ML
WBC # FLD AUTO: 4.63 K/UL

## 2023-03-15 LAB
CULTURE RESULTS: SIGNIFICANT CHANGE UP
SPECIMEN SOURCE: SIGNIFICANT CHANGE UP

## 2023-04-01 LAB
CULTURE RESULTS: SIGNIFICANT CHANGE UP
SPECIMEN SOURCE: SIGNIFICANT CHANGE UP

## 2023-04-04 ENCOUNTER — TRANSCRIPTION ENCOUNTER (OUTPATIENT)
Age: 38
End: 2023-04-04

## 2023-04-17 NOTE — HISTORY OF PRESENT ILLNESS
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 CT head on admission, with \par Changes from anterior bifrontal ventriculostomy catheters in place. Anterior bifrontal encephalomalacia and gliosis. Findings suggestive of callosal dysgenesis. CT abdomen revealed Ventriculoperitoneal shunt. Small amount of \par fluid in the pelvis and left upper abdomen with mild loculation and enhancement suggesting peritonitis.  Cultures were sent. \par Neurosurgery and general surgery consulted. Empiric antibiotics were initiated. CSF sent for analysis by neurosurgery. She was given Vancomycin, Cefepime and Metronidazole. IR consulted to assess pelvic collections for drainage, advised to continue current antibiotics and re image if she clinically worsens. Her mental status improved. Blood and CSF cultures were negative. Her mental status improved. ID recommended transitioning to Levofloxacin and Flagyl PO to be given until 3/3/23. Repeat head CT recommended by neurosurgery. CT was stable. \par She was medically stable for discharge home. \par \par

## 2023-04-17 NOTE — PHYSICAL EXAM
[Normal] : soft, non-tender, non-distended, no masses palpated, no HSM and normal bowel sounds [27230 - Moderate Complexity requires multiple possible diagnoses and/or the management options, moderate complexity of the medical data (tests, etc.) to be reviewed, and moderate risk of significant complications, morbidity, and/or mortality as well as co] : Moderate Complexity [de-identified] : wheelchair bound [de-identified] : Non verbal, no repond to simple orders.

## 2023-04-17 NOTE — ASSESSMENT
[FreeTextEntry1] : PRINCIPAL DISCHARGE DIAGNOSIS \par # Peritonitis \par - complete course of Levofloxacin and FLgyl until 3/3/23 \par -follow up with ID. \par \par # Seizure \par - continue anti epileptics as instructed. \par \par # S/P  shunt \par - CT stable \par -follow up with neurosurgery \par \par # Chronic constipation:\par -GI f/up\par \par #Incontinent to urine/stools-scripts for incontinent  supplies generated. \par  \par \par

## 2023-04-17 NOTE — HEALTH RISK ASSESSMENT
[Intercurrent hospitalizations] : was admitted to the hospital  [No] : In the past 12 months have you used drugs other than those required for medical reasons? No [Patient not ambulatory] : Patient is not ambulatory [0] : 2) Feeling down, depressed, or hopeless: Not at all (0) [PHQ-2 Negative - No further assessment needed] : PHQ-2 Negative - No further assessment needed [Patient declined mammogram] : Patient declined mammogram [Patient declined PAP Smear] : Patient declined PAP Smear [Patient declined bone density test] : Patient declined bone density test [Patient declined colonoscopy] : Patient declined colonoscopy [HIV test declined] : HIV test declined [Hepatitis C test declined] : Hepatitis C test declined [Language] : difficulty with language [Behavior] : difficulty with behavior [Learning/Retaining New Information] : difficulty learning/retaining new information [Handling Complex Tasks] : difficulty handling complex tasks [Reasoning] : difficulty with reasoning [Spatial Ability and Orientation] : difficulty with spatial ability and orientation [None] : None [With Family] : lives with family [On disability] : on disability [Full assistance needed] : managing finances [With Patient/Caregiver] : , with patient/caregiver [Reviewed no changes] : Reviewed, no changes [de-identified] : no [de-identified] : no [[id number]] : 0 [Change in mental status noted] : No change in mental status noted [Sexually Active] : not sexually active [AdvancecareDate] : 02/23

## 2023-04-18 ENCOUNTER — NON-APPOINTMENT (OUTPATIENT)
Age: 38
End: 2023-04-18

## 2023-09-26 ENCOUNTER — NON-APPOINTMENT (OUTPATIENT)
Age: 38
End: 2023-09-26

## 2023-11-14 ENCOUNTER — APPOINTMENT (OUTPATIENT)
Dept: FAMILY MEDICINE | Facility: CLINIC | Age: 38
End: 2023-11-14
Payer: MEDICARE

## 2023-11-14 VITALS
HEIGHT: 60 IN | HEART RATE: 92 BPM | BODY MASS INDEX: 21.01 KG/M2 | DIASTOLIC BLOOD PRESSURE: 80 MMHG | RESPIRATION RATE: 12 BRPM | OXYGEN SATURATION: 98 % | SYSTOLIC BLOOD PRESSURE: 104 MMHG | WEIGHT: 107 LBS

## 2023-11-14 DIAGNOSIS — Z00.00 ENCOUNTER FOR GENERAL ADULT MEDICAL EXAMINATION W/OUT ABNORMAL FINDINGS: ICD-10-CM

## 2023-11-14 PROCEDURE — G0439: CPT

## 2023-11-14 PROCEDURE — 36415 COLL VENOUS BLD VENIPUNCTURE: CPT

## 2023-11-14 RX ORDER — DIAZEPAM 2 MG/1
2 TABLET ORAL TWICE DAILY
Qty: 6 | Refills: 0 | Status: ACTIVE | COMMUNITY
Start: 2023-11-14

## 2023-11-15 LAB
ALBUMIN SERPL ELPH-MCNC: 4.9 G/DL
ALP BLD-CCNC: 73 U/L
ALT SERPL-CCNC: 30 U/L
ANION GAP SERPL CALC-SCNC: 15 MMOL/L
AST SERPL-CCNC: 21 U/L
BASOPHILS # BLD AUTO: 0.02 K/UL
BASOPHILS NFR BLD AUTO: 0.5 %
BILIRUB SERPL-MCNC: 0.2 MG/DL
BUN SERPL-MCNC: 16 MG/DL
CALCIUM SERPL-MCNC: 9.6 MG/DL
CHLORIDE SERPL-SCNC: 109 MMOL/L
CHOLEST SERPL-MCNC: 176 MG/DL
CO2 SERPL-SCNC: 19 MMOL/L
CREAT SERPL-MCNC: 0.57 MG/DL
EGFR: 119 ML/MIN/1.73M2
EOSINOPHIL # BLD AUTO: 0 K/UL
EOSINOPHIL NFR BLD AUTO: 0 %
ESTIMATED AVERAGE GLUCOSE: 88 MG/DL
GLUCOSE SERPL-MCNC: 85 MG/DL
HBA1C MFR BLD HPLC: 4.7 %
HCT VFR BLD CALC: 43.9 %
HDLC SERPL-MCNC: 79 MG/DL
HGB BLD-MCNC: 14.9 G/DL
IMM GRANULOCYTES NFR BLD AUTO: 0 %
LDLC SERPL CALC-MCNC: 80 MG/DL
LYMPHOCYTES # BLD AUTO: 1.37 K/UL
LYMPHOCYTES NFR BLD AUTO: 34.4 %
MAN DIFF?: NORMAL
MCHC RBC-ENTMCNC: 31.6 PG
MCHC RBC-ENTMCNC: 33.9 GM/DL
MCV RBC AUTO: 93.2 FL
MONOCYTES # BLD AUTO: 0.27 K/UL
MONOCYTES NFR BLD AUTO: 6.8 %
NEUTROPHILS # BLD AUTO: 2.32 K/UL
NEUTROPHILS NFR BLD AUTO: 58.3 %
NONHDLC SERPL-MCNC: 96 MG/DL
PLATELET # BLD AUTO: 183 K/UL
POTASSIUM SERPL-SCNC: 4.8 MMOL/L
PROT SERPL-MCNC: 7.4 G/DL
RBC # BLD: 4.71 M/UL
RBC # FLD: 12.3 %
SODIUM SERPL-SCNC: 142 MMOL/L
TRIGL SERPL-MCNC: 94 MG/DL
TSH SERPL-ACNC: 1.08 UIU/ML
WBC # FLD AUTO: 3.98 K/UL

## 2023-12-04 NOTE — PATIENT PROFILE ADULT - IS THERE A SUSPICION OF ABUSE/NEGLIGENCE?
Patient was recommended from her MRI Breast for a Left 2nd look ultrasound. Scheduled for 12/28/2023 at 8:00 with 7:45 arrival at [facility] [place]. No BT. Patient does have an allergy to LIDOCAINE. Encouraged to call with any further questions.    no

## 2023-12-13 ENCOUNTER — NON-APPOINTMENT (OUTPATIENT)
Age: 38
End: 2023-12-13

## 2023-12-27 ENCOUNTER — NON-APPOINTMENT (OUTPATIENT)
Age: 38
End: 2023-12-27

## 2024-02-29 ENCOUNTER — APPOINTMENT (OUTPATIENT)
Dept: FAMILY MEDICINE | Facility: CLINIC | Age: 39
End: 2024-02-29
Payer: MEDICARE

## 2024-02-29 VITALS
HEIGHT: 60 IN | DIASTOLIC BLOOD PRESSURE: 70 MMHG | BODY MASS INDEX: 21.6 KG/M2 | OXYGEN SATURATION: 99 % | HEART RATE: 90 BPM | SYSTOLIC BLOOD PRESSURE: 118 MMHG | RESPIRATION RATE: 12 BRPM | WEIGHT: 110 LBS

## 2024-02-29 DIAGNOSIS — Z74.2 NEED FOR ASSISTANCE AT HOME AND NO OTHER HOUSEHOLD MEMBER ABLE TO RENDER CARE: ICD-10-CM

## 2024-02-29 DIAGNOSIS — H92.01 OTALGIA, RIGHT EAR: ICD-10-CM

## 2024-02-29 DIAGNOSIS — Z02.89 ENCOUNTER FOR OTHER ADMINISTRATIVE EXAMINATIONS: ICD-10-CM

## 2024-02-29 PROCEDURE — 99214 OFFICE O/P EST MOD 30 MIN: CPT

## 2024-02-29 PROCEDURE — G2211 COMPLEX E/M VISIT ADD ON: CPT

## 2024-02-29 RX ORDER — ACETIC ACID 20 MG/ML
2 SOLUTION AURICULAR (OTIC) DAILY
Qty: 1 | Refills: 0 | Status: ACTIVE | COMMUNITY
Start: 2024-02-29 | End: 1900-01-01

## 2024-02-29 NOTE — ASSESSMENT
[FreeTextEntry1] :   # HCM: -Nov 2023 - [facility] breast screening> Hx Left breat Bxp -Immunizations Flu shot 10/05/23 COVID booster 11/09/23   # Past hx Peritonitis - complete course of Levofloxacin and FLgyl until 3/3/23  # Seizure - continue anti epileptics as instructed.  # S/P  shunt - CT stable -follow up with neurosurgery  # Chronic constipation: -GI f/up  #Incontinent to urine/stools-scripts for incontinent supplies generated.

## 2024-02-29 NOTE — HISTORY OF PRESENT ILLNESS
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 Pt is totaly dependent with all ADL. Seen by NeuroSx at [facility] Schedule for CT Feb 2024. Mother reports pt is constantly pulling out her RT ear Now with 40hs/ week health Aid. She has OT/PT anfd speech therapy

## 2024-02-29 NOTE — PHYSICAL EXAM
[Normal] : soft, non-tender, non-distended, no masses palpated, no HSM and normal bowel sounds [de-identified] : wheelchair bound [de-identified] : Non verbal, no repond to simple orders.Hypertonicity

## 2024-02-29 NOTE — COUNSELING
[Fall prevention counseling provided] : Fall prevention counseling provided [Adequate lighting] : Adequate lighting [Behavioral health counseling provided] : Behavioral health counseling provided [Good understanding] : Patient has a good understanding of lifestyle changes and steps needed to achieve self management goal [None] : None

## 2024-03-25 ENCOUNTER — APPOINTMENT (OUTPATIENT)
Dept: FAMILY MEDICINE | Facility: CLINIC | Age: 39
End: 2024-03-25

## 2024-03-25 ENCOUNTER — NON-APPOINTMENT (OUTPATIENT)
Age: 39
End: 2024-03-25

## 2024-04-03 ENCOUNTER — NON-APPOINTMENT (OUTPATIENT)
Age: 39
End: 2024-04-03

## 2024-06-11 NOTE — ED PROVIDER NOTE - CARE PLAN
[Time Spent: ___ minutes] : I have spent [unfilled] minutes of time on the encounter. 1 Principal Discharge DX:	Abscess

## 2024-06-18 ENCOUNTER — OUTPATIENT (OUTPATIENT)
Dept: OUTPATIENT SERVICES | Facility: HOSPITAL | Age: 39
LOS: 1 days | End: 2024-06-18
Payer: MEDICARE

## 2024-06-18 VITALS
RESPIRATION RATE: 18 BRPM | DIASTOLIC BLOOD PRESSURE: 67 MMHG | WEIGHT: 106.04 LBS | HEART RATE: 69 BPM | OXYGEN SATURATION: 100 % | TEMPERATURE: 98 F | HEIGHT: 60 IN | SYSTOLIC BLOOD PRESSURE: 97 MMHG

## 2024-06-18 DIAGNOSIS — Z98.890 OTHER SPECIFIED POSTPROCEDURAL STATES: Chronic | ICD-10-CM

## 2024-06-18 DIAGNOSIS — K02.9 DENTAL CARIES, UNSPECIFIED: ICD-10-CM

## 2024-06-18 DIAGNOSIS — Z01.818 ENCOUNTER FOR OTHER PREPROCEDURAL EXAMINATION: ICD-10-CM

## 2024-06-18 DIAGNOSIS — R56.9 UNSPECIFIED CONVULSIONS: ICD-10-CM

## 2024-06-18 DIAGNOSIS — K05.6 PERIODONTAL DISEASE, UNSPECIFIED: ICD-10-CM

## 2024-06-18 DIAGNOSIS — Z92.89 PERSONAL HISTORY OF OTHER MEDICAL TREATMENT: Chronic | ICD-10-CM

## 2024-06-18 DIAGNOSIS — Z98.2 PRESENCE OF CEREBROSPINAL FLUID DRAINAGE DEVICE: Chronic | ICD-10-CM

## 2024-06-18 DIAGNOSIS — K02.62 DENTAL CARIES ON SMOOTH SURFACE PENETRATING INTO DENTIN: ICD-10-CM

## 2024-06-18 LAB
ANION GAP SERPL CALC-SCNC: 13 MMOL/L — SIGNIFICANT CHANGE UP (ref 5–17)
BUN SERPL-MCNC: 18 MG/DL — SIGNIFICANT CHANGE UP (ref 7–23)
CALCIUM SERPL-MCNC: 9.5 MG/DL — SIGNIFICANT CHANGE UP (ref 8.4–10.5)
CHLORIDE SERPL-SCNC: 111 MMOL/L — HIGH (ref 96–108)
CO2 SERPL-SCNC: 20 MMOL/L — LOW (ref 22–31)
CREAT SERPL-MCNC: 0.63 MG/DL — SIGNIFICANT CHANGE UP (ref 0.5–1.3)
EGFR: 116 ML/MIN/1.73M2 — SIGNIFICANT CHANGE UP
GLUCOSE SERPL-MCNC: 83 MG/DL — SIGNIFICANT CHANGE UP (ref 70–99)
HCT VFR BLD CALC: 40.2 % — SIGNIFICANT CHANGE UP (ref 34.5–45)
HGB BLD-MCNC: 13.4 G/DL — SIGNIFICANT CHANGE UP (ref 11.5–15.5)
MCHC RBC-ENTMCNC: 30.5 PG — SIGNIFICANT CHANGE UP (ref 27–34)
MCHC RBC-ENTMCNC: 33.3 GM/DL — SIGNIFICANT CHANGE UP (ref 32–36)
MCV RBC AUTO: 91.4 FL — SIGNIFICANT CHANGE UP (ref 80–100)
NRBC # BLD: 0 /100 WBCS — SIGNIFICANT CHANGE UP (ref 0–0)
PLATELET # BLD AUTO: 183 K/UL — SIGNIFICANT CHANGE UP (ref 150–400)
POTASSIUM SERPL-MCNC: 4.6 MMOL/L — SIGNIFICANT CHANGE UP (ref 3.5–5.3)
POTASSIUM SERPL-SCNC: 4.6 MMOL/L — SIGNIFICANT CHANGE UP (ref 3.5–5.3)
RBC # BLD: 4.4 M/UL — SIGNIFICANT CHANGE UP (ref 3.8–5.2)
RBC # FLD: 12.2 % — SIGNIFICANT CHANGE UP (ref 10.3–14.5)
SODIUM SERPL-SCNC: 144 MMOL/L — SIGNIFICANT CHANGE UP (ref 135–145)
WBC # BLD: 3.48 K/UL — LOW (ref 3.8–10.5)
WBC # FLD AUTO: 3.48 K/UL — LOW (ref 3.8–10.5)

## 2024-06-18 PROCEDURE — 80048 BASIC METABOLIC PNL TOTAL CA: CPT

## 2024-06-18 PROCEDURE — 85027 COMPLETE CBC AUTOMATED: CPT

## 2024-06-18 PROCEDURE — G0463: CPT

## 2024-06-18 RX ORDER — SODIUM CHLORIDE 0.9 % (FLUSH) 0.9 %
3 SYRINGE (ML) INJECTION EVERY 8 HOURS
Refills: 0 | Status: DISCONTINUED | OUTPATIENT
Start: 2024-06-25 | End: 2024-07-10

## 2024-06-18 RX ORDER — DEXTROSE MONOHYDRATE AND SODIUM CHLORIDE 5; .3 G/100ML; G/100ML
100 INJECTION, SOLUTION INTRAVENOUS
Refills: 0 | Status: DISCONTINUED | OUTPATIENT
Start: 2024-06-25 | End: 2024-07-10

## 2024-06-20 ENCOUNTER — APPOINTMENT (OUTPATIENT)
Dept: FAMILY MEDICINE | Facility: CLINIC | Age: 39
End: 2024-06-20
Payer: MEDICARE

## 2024-06-20 VITALS
RESPIRATION RATE: 14 BRPM | BODY MASS INDEX: 20.82 KG/M2 | TEMPERATURE: 97.9 F | DIASTOLIC BLOOD PRESSURE: 64 MMHG | WEIGHT: 106.04 LBS | HEART RATE: 60 BPM | OXYGEN SATURATION: 98 % | HEIGHT: 60 IN | SYSTOLIC BLOOD PRESSURE: 110 MMHG

## 2024-06-20 DIAGNOSIS — Z01.818 ENCOUNTER FOR OTHER PREPROCEDURAL EXAMINATION: ICD-10-CM

## 2024-06-20 DIAGNOSIS — K08.9 DISORDER OF TEETH AND SUPPORTING STRUCTURES, UNSPECIFIED: ICD-10-CM

## 2024-06-20 DIAGNOSIS — G40.909 EPILEPSY, UNSPECIFIED, NOT INTRACTABLE, W/OUT STATUS EPILEPTICUS: ICD-10-CM

## 2024-06-20 DIAGNOSIS — G80.9 CEREBRAL PALSY, UNSPECIFIED: ICD-10-CM

## 2024-06-20 PROCEDURE — 99214 OFFICE O/P EST MOD 30 MIN: CPT

## 2024-06-20 PROCEDURE — G2211 COMPLEX E/M VISIT ADD ON: CPT

## 2024-06-20 NOTE — HISTORY OF PRESENT ILLNESS
[No Pertinent Cardiac History] : no history of aortic stenosis, atrial fibrillation, coronary artery disease, recent myocardial infarction, or implantable device/pacemaker [No Pertinent Pulmonary History] : no history of asthma, COPD, sleep apnea, or smoking [No Adverse Anesthesia Reaction] : no adverse anesthesia reaction in self or family member [Chronic Anticoagulation] : no chronic anticoagulation [Chronic Kidney Disease] : no chronic kidney disease [Diabetes] : no diabetes [(Patient denies any chest pain, claudication, dyspnea on exertion, orthopnea, palpitations or syncope)] : Patient denies any chest pain, claudication, dyspnea on exertion, orthopnea, palpitations or syncope [FreeTextEntry1] : DEantal procedure with anesthesia [FreeTextEntry2] : 6/24/24 [FreeTextEntry3] : Dr Sam [FreeTextEntry4] : 38 yr old female with 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 Pt is totaly dependent with all ADL. Seen by NeuroSx at [facility]> Here for medical clearance for Dental procedure. [Parent] : parent

## 2024-06-20 NOTE — ASSESSMENT
[High Risk Surgery - Intraperitoneal, Intrathoracic or Supringuinal Vascular Procedures] : High Risk Surgery - Intraperitoneal, Intrathoracic or Supringuinal Vascular Procedures - No (0) [Ischemic Heart Disease] : Ischemic Heart Disease - No (0) [Congestive Heart Failure] : Congestive Heart Failure - No (0) [Prior Cerebrovascular Accident or TIA] : Prior Cerebrovascular Accident or TIA - No (0) [Creatinine >= 2mg/dL (1 Point)] : Creatinine >= 2mg/dL - No (0) [Insulin-dependent Diabetic (1 Point)] : Insulin-dependent Diabetic - No (0) [0] : 0 , RCRI Class: I, Risk of Post-Op Cardiac Complications: 3.9%, 95% CI for Risk Estimate: 2.8% - 5.4% [Patient Optimized for Surgery] : Patient optimized for surgery [No Further Testing Recommended] : no further testing recommended [Continue medications as is] : Continue current medications [As per surgery] : as per surgery [[email or web address]] : Pt with no absolute contraindication for surgical procedure . Clear from clinical stand point.

## 2024-06-20 NOTE — PHYSICAL EXAM
[Normal] : normal rate, regular rhythm, normal S1 and S2 and no murmur heard [de-identified] : Alert, awake, non verbal. Wheelchair bound

## 2024-06-24 ENCOUNTER — TRANSCRIPTION ENCOUNTER (OUTPATIENT)
Age: 39
End: 2024-06-24

## 2024-06-25 ENCOUNTER — OUTPATIENT (OUTPATIENT)
Dept: OUTPATIENT SERVICES | Facility: HOSPITAL | Age: 39
LOS: 1 days | End: 2024-06-25
Payer: MEDICARE

## 2024-06-25 ENCOUNTER — TRANSCRIPTION ENCOUNTER (OUTPATIENT)
Age: 39
End: 2024-06-25

## 2024-06-25 VITALS
SYSTOLIC BLOOD PRESSURE: 110 MMHG | OXYGEN SATURATION: 96 % | DIASTOLIC BLOOD PRESSURE: 78 MMHG | RESPIRATION RATE: 18 BRPM | HEART RATE: 71 BPM

## 2024-06-25 VITALS
OXYGEN SATURATION: 98 % | HEART RATE: 74 BPM | TEMPERATURE: 98 F | HEIGHT: 60 IN | RESPIRATION RATE: 16 BRPM | DIASTOLIC BLOOD PRESSURE: 70 MMHG | WEIGHT: 106.04 LBS | SYSTOLIC BLOOD PRESSURE: 108 MMHG

## 2024-06-25 DIAGNOSIS — K02.62 DENTAL CARIES ON SMOOTH SURFACE PENETRATING INTO DENTIN: ICD-10-CM

## 2024-06-25 DIAGNOSIS — Z98.890 OTHER SPECIFIED POSTPROCEDURAL STATES: Chronic | ICD-10-CM

## 2024-06-25 DIAGNOSIS — K05.6 PERIODONTAL DISEASE, UNSPECIFIED: ICD-10-CM

## 2024-06-25 DIAGNOSIS — Z98.2 PRESENCE OF CEREBROSPINAL FLUID DRAINAGE DEVICE: Chronic | ICD-10-CM

## 2024-06-25 DIAGNOSIS — Z92.89 PERSONAL HISTORY OF OTHER MEDICAL TREATMENT: Chronic | ICD-10-CM

## 2024-06-25 PROCEDURE — D2391: CPT

## 2024-06-25 PROCEDURE — D1208: CPT

## 2024-06-25 PROCEDURE — D4341: CPT

## 2024-06-25 PROCEDURE — D4210: CPT

## 2024-06-25 PROCEDURE — D2392: CPT

## 2024-06-25 PROCEDURE — D2393: CPT

## 2024-06-25 DEVICE — SURGIFOAM PAD 8CM X 12.5CM X 10MM (100): Type: IMPLANTABLE DEVICE | Status: FUNCTIONAL

## 2024-06-25 RX ORDER — BACILLUS COAGULANS/INULIN 21B-1 G
1 TABLET,CHEWABLE ORAL
Refills: 0 | DISCHARGE

## 2024-06-25 RX ORDER — CARBAMAZEPINE 200 MG
3 TABLET ORAL
Qty: 0 | Refills: 0 | DISCHARGE

## 2024-06-25 RX ORDER — DIAZEPAM 5 MG
0 TABLET ORAL
Qty: 0 | Refills: 0 | DISCHARGE

## 2024-06-25 RX ORDER — ZONISAMIDE 100 MG
1 CAPSULE ORAL
Qty: 0 | Refills: 0 | DISCHARGE

## 2024-06-25 RX ORDER — LIDOCAINE HYDROCHLORIDE 20 MG/ML
0.2 INJECTION, SOLUTION EPIDURAL; INFILTRATION; INTRACAUDAL; PERINEURAL ONCE
Refills: 0 | Status: COMPLETED | OUTPATIENT
Start: 2024-06-25 | End: 2024-06-25

## 2024-06-25 RX ORDER — DIAZEPAM 5 MG
1 TABLET ORAL
Qty: 0 | Refills: 0 | DISCHARGE

## 2024-06-25 RX ORDER — ACETAZOLAMIDE 250 MG/1
1 TABLET ORAL
Qty: 0 | Refills: 0 | DISCHARGE

## 2024-06-25 RX ORDER — ERGOCALCIFEROL 1.25 MG/1
2 CAPSULE ORAL
Qty: 0 | Refills: 0 | DISCHARGE

## 2024-06-25 RX ADMIN — Medication 3 MILLILITER(S): at 14:03

## 2024-06-25 RX ADMIN — DEXTROSE MONOHYDRATE AND SODIUM CHLORIDE 30 MILLILITER(S): 5; .3 INJECTION, SOLUTION INTRAVENOUS at 13:57

## 2024-07-29 ENCOUNTER — APPOINTMENT (OUTPATIENT)
Dept: PULMONOLOGY | Facility: CLINIC | Age: 39
End: 2024-07-29

## 2024-09-09 ENCOUNTER — RX RENEWAL (OUTPATIENT)
Age: 39
End: 2024-09-09

## 2025-05-01 ENCOUNTER — NON-APPOINTMENT (OUTPATIENT)
Age: 40
End: 2025-05-01

## 2025-05-02 ENCOUNTER — APPOINTMENT (OUTPATIENT)
Dept: FAMILY MEDICINE | Facility: CLINIC | Age: 40
End: 2025-05-02
Payer: MEDICARE

## 2025-05-02 VITALS
BODY MASS INDEX: 19.24 KG/M2 | HEART RATE: 86 BPM | DIASTOLIC BLOOD PRESSURE: 64 MMHG | WEIGHT: 98 LBS | RESPIRATION RATE: 15 BRPM | OXYGEN SATURATION: 98 % | HEIGHT: 60 IN | TEMPERATURE: 97.9 F | SYSTOLIC BLOOD PRESSURE: 110 MMHG

## 2025-05-02 DIAGNOSIS — Z74.1 NEED FOR ASSISTANCE WITH PERSONAL CARE: ICD-10-CM

## 2025-05-02 DIAGNOSIS — G40.909 EPILEPSY, UNSPECIFIED, NOT INTRACTABLE, W/OUT STATUS EPILEPTICUS: ICD-10-CM

## 2025-05-02 DIAGNOSIS — G80.9 CEREBRAL PALSY, UNSPECIFIED: ICD-10-CM

## 2025-05-02 DIAGNOSIS — Z91.030 BEE ALLERGY STATUS: ICD-10-CM

## 2025-05-02 PROCEDURE — 99214 OFFICE O/P EST MOD 30 MIN: CPT

## 2025-05-02 PROCEDURE — G2211 COMPLEX E/M VISIT ADD ON: CPT

## 2025-09-08 ENCOUNTER — APPOINTMENT (OUTPATIENT)
Dept: FAMILY MEDICINE | Facility: CLINIC | Age: 40
End: 2025-09-08

## (undated) DEVICE — MEDICATION LABELS W MARKER

## (undated) DEVICE — WARMING BLANKET LOWER ADULT

## (undated) DEVICE — DRAPE TOWEL BLUE 17" X 24"

## (undated) DEVICE — VENODYNE/SCD SLEEVE CALF MEDIUM

## (undated) DEVICE — DRAPE INSTRUMENT POUCH 6.75" X 11"

## (undated) DEVICE — ELCTR BOVIE PENCIL SMOKE EVACUATION

## (undated) DEVICE — SPONGE END-STRUNG CYLINDRICAL .5X1.5"

## (undated) DEVICE — SUT CHROMIC 3-0 30" C-13

## (undated) DEVICE — SOL IRR POUR H2O 250ML

## (undated) DEVICE — ELCTR BOVIE TIP NEEDLE INSULATED 2.8" EDGE

## (undated) DEVICE — POSITIONER FOAM EGG CRATE ULNAR 2PCS (PINK)

## (undated) DEVICE — PACK DENTAL

## (undated) DEVICE — DRAPE MAGNETIC INSTRUMENT MEDIUM

## (undated) DEVICE — SOL IRR POUR NS 0.9% 500ML

## (undated) DEVICE — GLV 6 PROTEXIS (BLUE)